# Patient Record
Sex: MALE | Race: WHITE | NOT HISPANIC OR LATINO | Employment: OTHER | URBAN - METROPOLITAN AREA
[De-identification: names, ages, dates, MRNs, and addresses within clinical notes are randomized per-mention and may not be internally consistent; named-entity substitution may affect disease eponyms.]

---

## 2018-06-13 DIAGNOSIS — N13.9 BENIGN LOCALIZED HYPERPLASIA OF PROSTATE WITH URINARY OBSTRUCTION AND LOWER URINARY TRACT SYMPTOMS: Primary | ICD-10-CM

## 2018-06-13 DIAGNOSIS — N40.1 BENIGN LOCALIZED HYPERPLASIA OF PROSTATE WITH URINARY OBSTRUCTION AND LOWER URINARY TRACT SYMPTOMS: Primary | ICD-10-CM

## 2018-06-13 RX ORDER — FINASTERIDE 5 MG/1
5 TABLET, FILM COATED ORAL DAILY
Qty: 90 TABLET | Refills: 0 | Status: SHIPPED | OUTPATIENT
Start: 2018-06-13 | End: 2018-09-17 | Stop reason: SDUPTHER

## 2018-06-13 NOTE — TELEPHONE ENCOUNTER
An Auto-fax Refill Request for Finasteride was received from [phone]  Patient's next office visit is scheduled for 7/17/18 and he will run out of medication until then    Script for same, 90 day supply with NO refills was queued and forwarded to Dr León Kwan for approval

## 2018-07-16 RX ORDER — TAMSULOSIN HYDROCHLORIDE 0.4 MG/1
1 CAPSULE ORAL
COMMUNITY
Start: 2017-09-11 | End: 2018-09-27 | Stop reason: SDUPTHER

## 2018-07-17 ENCOUNTER — OFFICE VISIT (OUTPATIENT)
Dept: UROLOGY | Facility: MEDICAL CENTER | Age: 78
End: 2018-07-17
Payer: MEDICARE

## 2018-07-17 VITALS
WEIGHT: 172 LBS | BODY MASS INDEX: 26.07 KG/M2 | SYSTOLIC BLOOD PRESSURE: 116 MMHG | HEIGHT: 68 IN | DIASTOLIC BLOOD PRESSURE: 62 MMHG

## 2018-07-17 DIAGNOSIS — N40.1 BENIGN PROSTATIC HYPERPLASIA WITH LOWER URINARY TRACT SYMPTOMS, SYMPTOM DETAILS UNSPECIFIED: Primary | ICD-10-CM

## 2018-07-17 DIAGNOSIS — R35.1 NOCTURIA: ICD-10-CM

## 2018-07-17 DIAGNOSIS — N52.2 DRUG-INDUCED ERECTILE DYSFUNCTION: ICD-10-CM

## 2018-07-17 DIAGNOSIS — R97.20 ELEVATED PSA: ICD-10-CM

## 2018-07-17 DIAGNOSIS — Z87.442 PERSONAL HISTORY OF URINARY CALCULI: ICD-10-CM

## 2018-07-17 PROCEDURE — 99214 OFFICE O/P EST MOD 30 MIN: CPT | Performed by: UROLOGY

## 2018-07-17 NOTE — PROGRESS NOTES
Assessment/Plan:      Diagnoses and all orders for this visit:    Benign prostatic hyperplasia with lower urinary tract symptoms, symptom details unspecified    Elevated PSA  -     PSA, total and free; Future    Nocturia    Personal history of urinary calculi    Drug-induced erectile dysfunction    Other orders  -     tamsulosin (FLOMAX) 0 4 mg; Take 1 capsule by mouth  -     Cholecalciferol 5000 units capsule; 1 tab daily        Plan:  Trial of sildenafil 3-4 tabs p r n     Check PSA, follow-up 1 year  Subjective:  No complaints, except some ED     Patient ID: Sharif Bui is a 77 y o  male  HPI  Patient is here in follow-up for his history of elevated PSA status post a negative prostate biopsy in the past   He has a history of BPH for which he underwent a TURP initially back in 1998 and has subsequently been on Flomax & Proscar to maintain his acceptable in usual voiding ability  He does have some associated nocturia 2-3 times per night, but he does not have any need to strain or bear down the started urinary stream, and feels he empties his bladder reasonably well  He has a history of nephrolithiasis for which he underwent an ESWL about 3 years ago  He is interested in some possible medications for his ED, which is likely related to his elderly age, history of cardiac disease and medications for such, his prior TURP, and he being medicated with 2 BPH meds  Review of Systems   Constitutional: Negative  HENT: Negative  Eyes: Negative  Respiratory: Negative  Cardiovascular: Negative  Gastrointestinal: Negative  Endocrine: Negative  Genitourinary: Positive for frequency and urgency  Musculoskeletal: Negative  Allergic/Immunologic: Negative  Neurological: Negative  Hematological: Negative  Psychiatric/Behavioral: Negative  Objective:     Physical Exam   Constitutional: He is oriented to person, place, and time  He appears well-developed and well-nourished   No distress  HENT:   Head: Normocephalic and atraumatic  Nose: Nose normal    Mouth/Throat: Oropharynx is clear and moist    Eyes: Conjunctivae and EOM are normal  Pupils are equal, round, and reactive to light  No scleral icterus  Neck: Normal range of motion  Neck supple  Cardiovascular: Normal rate, regular rhythm, normal heart sounds and intact distal pulses  No murmur heard  Pulmonary/Chest: Effort normal and breath sounds normal  No respiratory distress  He has no wheezes  He has no rales  Abdominal: Soft  Bowel sounds are normal  He exhibits no distension and no mass  There is no tenderness  Musculoskeletal: Normal range of motion  He exhibits no edema or tenderness  Lymphadenopathy:     He has no cervical adenopathy  Neurological: He is alert and oriented to person, place, and time  No cranial nerve deficit  Skin: Skin is warm and dry  No rash noted  No erythema  No pallor  Psychiatric: He has a normal mood and affect  His behavior is normal  Judgment and thought content normal    Nursing note and vitals reviewed

## 2018-07-19 ENCOUNTER — APPOINTMENT (OUTPATIENT)
Dept: LAB | Facility: CLINIC | Age: 78
End: 2018-07-19
Payer: MEDICARE

## 2018-07-19 DIAGNOSIS — R97.20 ELEVATED PSA: ICD-10-CM

## 2018-07-19 PROCEDURE — 84153 ASSAY OF PSA TOTAL: CPT

## 2018-07-19 PROCEDURE — 84154 ASSAY OF PSA FREE: CPT

## 2018-07-19 PROCEDURE — 36415 COLL VENOUS BLD VENIPUNCTURE: CPT

## 2018-07-21 LAB
PSA FREE MFR SERPL: 15.2 %
PSA FREE SERPL-MCNC: 0.35 NG/ML
PSA SERPL-MCNC: 2.3 NG/ML (ref 0–4)

## 2018-09-17 DIAGNOSIS — N13.9 BENIGN LOCALIZED HYPERPLASIA OF PROSTATE WITH URINARY OBSTRUCTION AND LOWER URINARY TRACT SYMPTOMS: ICD-10-CM

## 2018-09-17 DIAGNOSIS — N40.1 BENIGN LOCALIZED HYPERPLASIA OF PROSTATE WITH URINARY OBSTRUCTION AND LOWER URINARY TRACT SYMPTOMS: ICD-10-CM

## 2018-09-17 RX ORDER — FINASTERIDE 5 MG/1
TABLET, FILM COATED ORAL
Qty: 90 TABLET | Refills: 0 | Status: SHIPPED | OUTPATIENT
Start: 2018-09-17 | End: 2018-12-18 | Stop reason: SDUPTHER

## 2018-09-27 DIAGNOSIS — N13.9 BENIGN LOCALIZED HYPERPLASIA OF PROSTATE WITH URINARY OBSTRUCTION AND LOWER URINARY TRACT SYMPTOMS: Primary | ICD-10-CM

## 2018-09-27 DIAGNOSIS — N40.1 BENIGN LOCALIZED HYPERPLASIA OF PROSTATE WITH URINARY OBSTRUCTION AND LOWER URINARY TRACT SYMPTOMS: Primary | ICD-10-CM

## 2018-09-27 RX ORDER — TAMSULOSIN HYDROCHLORIDE 0.4 MG/1
0.4 CAPSULE ORAL
Qty: 90 CAPSULE | Refills: 3 | Status: SHIPPED | OUTPATIENT
Start: 2018-09-27 | End: 2019-10-15 | Stop reason: SDUPTHER

## 2018-09-27 NOTE — TELEPHONE ENCOUNTER
Patient called requesting refill on Tamsulosin 0 4mg    Request for same, 90 day supply with 3 refills was queued and forwarded to Dr Fer Ruth for approval

## 2018-12-18 DIAGNOSIS — N40.1 BENIGN LOCALIZED HYPERPLASIA OF PROSTATE WITH URINARY OBSTRUCTION AND LOWER URINARY TRACT SYMPTOMS: ICD-10-CM

## 2018-12-18 DIAGNOSIS — N13.9 BENIGN LOCALIZED HYPERPLASIA OF PROSTATE WITH URINARY OBSTRUCTION AND LOWER URINARY TRACT SYMPTOMS: ICD-10-CM

## 2018-12-18 RX ORDER — FINASTERIDE 5 MG/1
TABLET, FILM COATED ORAL
Qty: 90 TABLET | Refills: 2 | Status: SHIPPED | OUTPATIENT
Start: 2018-12-18 | End: 2019-09-10 | Stop reason: SDUPTHER

## 2018-12-18 NOTE — TELEPHONE ENCOUNTER
A [employer]Scripts Refill Request for Finasteride 5mg was received from [facility] DR SABIHA JORGENSEN  Patient was last seen in July, 2018; continuation of the medication was approved at that time    Script for same, 90 day supply with 2 refills was queued and forwarded to Dr Alice Whitt for approval

## 2019-03-25 ENCOUNTER — TRANSCRIBE ORDERS (OUTPATIENT)
Dept: ADMINISTRATIVE | Facility: HOSPITAL | Age: 79
End: 2019-03-25

## 2019-03-25 ENCOUNTER — APPOINTMENT (OUTPATIENT)
Dept: LAB | Facility: HOSPITAL | Age: 79
End: 2019-03-25
Attending: ORTHOPAEDIC SURGERY
Payer: MEDICARE

## 2019-03-25 DIAGNOSIS — M48.062 LUMBAR STENOSIS WITH NEUROGENIC CLAUDICATION: Primary | ICD-10-CM

## 2019-03-25 LAB
INR PPP: 1.34 (ref 0.86–1.16)
PROTHROMBIN TIME: 13.8 SECONDS (ref 9.4–11.7)

## 2019-03-25 PROCEDURE — 36415 COLL VENOUS BLD VENIPUNCTURE: CPT | Performed by: ORTHOPAEDIC SURGERY

## 2019-03-25 PROCEDURE — 85610 PROTHROMBIN TIME: CPT | Performed by: ORTHOPAEDIC SURGERY

## 2019-05-30 ENCOUNTER — OFFICE VISIT (OUTPATIENT)
Dept: UROLOGY | Facility: MEDICAL CENTER | Age: 79
End: 2019-05-30
Payer: MEDICARE

## 2019-05-30 VITALS
WEIGHT: 179 LBS | DIASTOLIC BLOOD PRESSURE: 70 MMHG | BODY MASS INDEX: 27.22 KG/M2 | SYSTOLIC BLOOD PRESSURE: 120 MMHG | HEART RATE: 84 BPM

## 2019-05-30 DIAGNOSIS — R35.1 NOCTURIA: ICD-10-CM

## 2019-05-30 DIAGNOSIS — R97.20 ELEVATED PSA: ICD-10-CM

## 2019-05-30 DIAGNOSIS — N52.2 DRUG-INDUCED ERECTILE DYSFUNCTION: ICD-10-CM

## 2019-05-30 DIAGNOSIS — N40.1 BENIGN PROSTATIC HYPERPLASIA WITH LOWER URINARY TRACT SYMPTOMS, SYMPTOM DETAILS UNSPECIFIED: Primary | ICD-10-CM

## 2019-05-30 DIAGNOSIS — Z87.442 PERSONAL HISTORY OF URINARY CALCULI: ICD-10-CM

## 2019-05-30 DIAGNOSIS — N28.89 RENAL MASS: ICD-10-CM

## 2019-05-30 LAB
SL AMB  POCT GLUCOSE, UA: ABNORMAL
SL AMB LEUKOCYTE ESTERASE,UA: ABNORMAL
SL AMB POCT BILIRUBIN,UA: ABNORMAL
SL AMB POCT BLOOD,UA: ABNORMAL
SL AMB POCT CLARITY,UA: CLEAR
SL AMB POCT COLOR,UA: YELLOW
SL AMB POCT KETONES,UA: ABNORMAL
SL AMB POCT NITRITE,UA: ABNORMAL
SL AMB POCT PH,UA: 5
SL AMB POCT SPECIFIC GRAVITY,UA: 1.02
SL AMB POCT URINE PROTEIN: ABNORMAL
SL AMB POCT UROBILINOGEN: 0.2

## 2019-05-30 PROCEDURE — 99214 OFFICE O/P EST MOD 30 MIN: CPT | Performed by: UROLOGY

## 2019-05-30 PROCEDURE — 81003 URINALYSIS AUTO W/O SCOPE: CPT | Performed by: UROLOGY

## 2019-06-04 ENCOUNTER — TRANSCRIBE ORDERS (OUTPATIENT)
Dept: ADMINISTRATIVE | Facility: HOSPITAL | Age: 79
End: 2019-06-04

## 2019-06-04 ENCOUNTER — APPOINTMENT (OUTPATIENT)
Dept: LAB | Facility: HOSPITAL | Age: 79
End: 2019-06-04
Payer: MEDICARE

## 2019-06-04 DIAGNOSIS — N28.89 RENAL MASS: ICD-10-CM

## 2019-06-04 LAB
ALBUMIN SERPL BCP-MCNC: 3.7 G/DL (ref 3.5–5)
ALP SERPL-CCNC: 88 U/L (ref 46–116)
ALT SERPL W P-5'-P-CCNC: 19 U/L (ref 12–78)
ANION GAP SERPL CALCULATED.3IONS-SCNC: 9 MMOL/L (ref 4–13)
AST SERPL W P-5'-P-CCNC: 18 U/L (ref 5–45)
BILIRUB SERPL-MCNC: 0.5 MG/DL (ref 0.2–1)
BUN SERPL-MCNC: 35 MG/DL (ref 5–25)
CALCIUM SERPL-MCNC: 8.1 MG/DL (ref 8.3–10.1)
CHLORIDE SERPL-SCNC: 109 MMOL/L (ref 100–108)
CO2 SERPL-SCNC: 28 MMOL/L (ref 21–32)
CREAT SERPL-MCNC: 0.96 MG/DL (ref 0.6–1.3)
GFR SERPL CREATININE-BSD FRML MDRD: 75 ML/MIN/1.73SQ M
GLUCOSE P FAST SERPL-MCNC: 100 MG/DL (ref 65–99)
POTASSIUM SERPL-SCNC: 4 MMOL/L (ref 3.5–5.3)
PROT SERPL-MCNC: 7 G/DL (ref 6.4–8.2)
SODIUM SERPL-SCNC: 146 MMOL/L (ref 136–145)

## 2019-06-04 PROCEDURE — 80053 COMPREHEN METABOLIC PANEL: CPT

## 2019-06-04 PROCEDURE — 36415 COLL VENOUS BLD VENIPUNCTURE: CPT

## 2019-06-06 ENCOUNTER — HOSPITAL ENCOUNTER (OUTPATIENT)
Dept: RADIOLOGY | Facility: HOSPITAL | Age: 79
Discharge: HOME | End: 2019-06-06
Payer: MEDICARE

## 2019-06-06 DIAGNOSIS — N28.89 RENAL MASS: ICD-10-CM

## 2019-06-06 PROCEDURE — 74178 CT ABD&PLV WO CNTR FLWD CNTR: CPT

## 2019-06-06 RX ADMIN — IOHEXOL 100 ML: 350 INJECTION, SOLUTION INTRAVENOUS at 08:18

## 2019-06-11 ENCOUNTER — TELEPHONE (OUTPATIENT)
Dept: UROLOGY | Facility: MEDICAL CENTER | Age: 79
End: 2019-06-11

## 2019-06-21 ENCOUNTER — OFFICE VISIT (OUTPATIENT)
Dept: UROLOGY | Facility: MEDICAL CENTER | Age: 79
End: 2019-06-21
Payer: MEDICARE

## 2019-06-21 VITALS
HEIGHT: 68 IN | SYSTOLIC BLOOD PRESSURE: 130 MMHG | DIASTOLIC BLOOD PRESSURE: 78 MMHG | BODY MASS INDEX: 26.37 KG/M2 | HEART RATE: 82 BPM | WEIGHT: 174 LBS

## 2019-06-21 DIAGNOSIS — N52.2 DRUG-INDUCED ERECTILE DYSFUNCTION: ICD-10-CM

## 2019-06-21 DIAGNOSIS — R35.1 NOCTURIA: ICD-10-CM

## 2019-06-21 DIAGNOSIS — N28.1 RENAL CYST, RIGHT: ICD-10-CM

## 2019-06-21 DIAGNOSIS — N40.1 BENIGN PROSTATIC HYPERPLASIA WITH LOWER URINARY TRACT SYMPTOMS, SYMPTOM DETAILS UNSPECIFIED: Primary | ICD-10-CM

## 2019-06-21 DIAGNOSIS — Z87.442 PERSONAL HISTORY OF URINARY CALCULI: ICD-10-CM

## 2019-06-21 DIAGNOSIS — R97.20 ELEVATED PSA: ICD-10-CM

## 2019-06-21 LAB
SL AMB  POCT GLUCOSE, UA: ABNORMAL
SL AMB LEUKOCYTE ESTERASE,UA: ABNORMAL
SL AMB POCT BILIRUBIN,UA: ABNORMAL
SL AMB POCT BLOOD,UA: ABNORMAL
SL AMB POCT CLARITY,UA: CLEAR
SL AMB POCT COLOR,UA: YELLOW
SL AMB POCT KETONES,UA: ABNORMAL
SL AMB POCT NITRITE,UA: ABNORMAL
SL AMB POCT PH,UA: 5
SL AMB POCT SPECIFIC GRAVITY,UA: >=1.03
SL AMB POCT URINE PROTEIN: ABNORMAL
SL AMB POCT UROBILINOGEN: 0.2

## 2019-06-21 PROCEDURE — 81003 URINALYSIS AUTO W/O SCOPE: CPT | Performed by: UROLOGY

## 2019-06-21 PROCEDURE — 99214 OFFICE O/P EST MOD 30 MIN: CPT | Performed by: UROLOGY

## 2019-09-10 DIAGNOSIS — N13.9 BENIGN LOCALIZED HYPERPLASIA OF PROSTATE WITH URINARY OBSTRUCTION AND LOWER URINARY TRACT SYMPTOMS: ICD-10-CM

## 2019-09-10 DIAGNOSIS — N40.1 BENIGN LOCALIZED HYPERPLASIA OF PROSTATE WITH URINARY OBSTRUCTION AND LOWER URINARY TRACT SYMPTOMS: ICD-10-CM

## 2019-09-10 RX ORDER — FINASTERIDE 5 MG/1
TABLET, FILM COATED ORAL
Qty: 90 TABLET | Refills: 2 | Status: SHIPPED | OUTPATIENT
Start: 2019-09-10 | End: 2020-06-18 | Stop reason: SDUPTHER

## 2019-10-14 DIAGNOSIS — N40.1 BENIGN LOCALIZED HYPERPLASIA OF PROSTATE WITH URINARY OBSTRUCTION AND LOWER URINARY TRACT SYMPTOMS: ICD-10-CM

## 2019-10-14 DIAGNOSIS — N13.9 BENIGN LOCALIZED HYPERPLASIA OF PROSTATE WITH URINARY OBSTRUCTION AND LOWER URINARY TRACT SYMPTOMS: ICD-10-CM

## 2019-10-14 NOTE — TELEPHONE ENCOUNTER
Patient left a message on the Medication Refill voice mail line requesting a new prescription for Tamsulosin 0 4mg, 90 day supply to [facility] in [place], [place]

## 2019-10-15 RX ORDER — TAMSULOSIN HYDROCHLORIDE 0.4 MG/1
0.4 CAPSULE ORAL
Qty: 90 CAPSULE | Refills: 0 | Status: SHIPPED | OUTPATIENT
Start: 2019-10-15 | End: 2020-01-21 | Stop reason: SDUPTHER

## 2019-10-15 NOTE — TELEPHONE ENCOUNTER
The patient has an upcoming office visit scheduled for 12/26/19 with Dr Napoleon Lopez in the [facility] location but will run out of medication until then    Request for same, 90 day supply with NO refills was queued and forwarded to the Advanced Practitioner covering the [facility] location for approval

## 2019-12-20 ENCOUNTER — APPOINTMENT (OUTPATIENT)
Dept: LAB | Facility: HOSPITAL | Age: 79
End: 2019-12-20
Payer: MEDICARE

## 2019-12-20 ENCOUNTER — TRANSCRIBE ORDERS (OUTPATIENT)
Dept: ADMINISTRATIVE | Facility: HOSPITAL | Age: 79
End: 2019-12-20

## 2019-12-20 DIAGNOSIS — R97.20 ELEVATED PSA: ICD-10-CM

## 2019-12-20 PROCEDURE — 84153 ASSAY OF PSA TOTAL: CPT

## 2019-12-20 PROCEDURE — 84154 ASSAY OF PSA FREE: CPT

## 2019-12-20 PROCEDURE — 36415 COLL VENOUS BLD VENIPUNCTURE: CPT

## 2019-12-21 LAB
PSA FREE MFR SERPL: 31.1 %
PSA FREE SERPL-MCNC: 0.28 NG/ML
PSA SERPL-MCNC: 0.9 NG/ML (ref 0–4)

## 2019-12-26 ENCOUNTER — OFFICE VISIT (OUTPATIENT)
Dept: UROLOGY | Facility: MEDICAL CENTER | Age: 79
End: 2019-12-26
Payer: MEDICARE

## 2019-12-26 VITALS
WEIGHT: 177 LBS | SYSTOLIC BLOOD PRESSURE: 140 MMHG | HEART RATE: 114 BPM | DIASTOLIC BLOOD PRESSURE: 74 MMHG | HEIGHT: 68 IN | BODY MASS INDEX: 26.83 KG/M2

## 2019-12-26 DIAGNOSIS — N40.1 BENIGN LOCALIZED HYPERPLASIA OF PROSTATE WITH URINARY OBSTRUCTION AND LOWER URINARY TRACT SYMPTOMS: Primary | ICD-10-CM

## 2019-12-26 DIAGNOSIS — Z87.442 PERSONAL HISTORY OF URINARY CALCULI: ICD-10-CM

## 2019-12-26 DIAGNOSIS — N52.2 DRUG-INDUCED ERECTILE DYSFUNCTION: ICD-10-CM

## 2019-12-26 DIAGNOSIS — Z87.898 HISTORY OF ELEVATED PSA: ICD-10-CM

## 2019-12-26 DIAGNOSIS — N13.9 BENIGN LOCALIZED HYPERPLASIA OF PROSTATE WITH URINARY OBSTRUCTION AND LOWER URINARY TRACT SYMPTOMS: Primary | ICD-10-CM

## 2019-12-26 DIAGNOSIS — R35.1 NOCTURIA: ICD-10-CM

## 2019-12-26 DIAGNOSIS — N28.1 RENAL CYST, RIGHT: ICD-10-CM

## 2019-12-26 LAB
SL AMB  POCT GLUCOSE, UA: ABNORMAL
SL AMB LEUKOCYTE ESTERASE,UA: ABNORMAL
SL AMB POCT BILIRUBIN,UA: ABNORMAL
SL AMB POCT BLOOD,UA: ABNORMAL
SL AMB POCT CLARITY,UA: CLEAR
SL AMB POCT COLOR,UA: YELLOW
SL AMB POCT KETONES,UA: ABNORMAL
SL AMB POCT NITRITE,UA: ABNORMAL
SL AMB POCT PH,UA: 5.5
SL AMB POCT SPECIFIC GRAVITY,UA: 1.03
SL AMB POCT URINE PROTEIN: ABNORMAL
SL AMB POCT UROBILINOGEN: 0.2

## 2019-12-26 PROCEDURE — 99214 OFFICE O/P EST MOD 30 MIN: CPT | Performed by: UROLOGY

## 2019-12-26 PROCEDURE — 81003 URINALYSIS AUTO W/O SCOPE: CPT | Performed by: UROLOGY

## 2019-12-26 RX ORDER — DILTIAZEM HYDROCHLORIDE 120 MG/1
120 TABLET, FILM COATED ORAL EVERY 12 HOURS SCHEDULED
Refills: 2 | COMMUNITY
Start: 2019-10-28 | End: 2022-03-03

## 2019-12-26 NOTE — PROGRESS NOTES
Assessment/Plan:      Diagnoses and all orders for this visit:    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 and lower urinary tract symptoms  -     POCT urine dip auto non-scope  -     PSA, total and free; Future    History of elevated PSA  -     POCT urine dip auto non-scope  -     PSA, total and free; Future    Nocturia  -     POCT urine dip auto non-scope    Personal history of urinary calculi  -     POCT urine dip auto non-scope    Drug-induced erectile dysfunction  -     POCT urine dip auto non-scope    Renal cyst, right  -     POCT urine dip auto non-scope    Other orders  -     diltiazem (CARDIZEM) 120 MG tablet; Take 120 mg by mouth every evening          Subjective:  No complaints     Patient ID: Raul Branch is a [de-identified] y o  male  HPI  Patient is here in follow-up for his history of elevated PSA status post a negative prostate biopsy in the past  [facility] has a history of BPH for which he underwent a TURP initially back in 1998 and has subsequently been on Flomax & Proscar to maintain his acceptable in usual voiding ability  [facility] does have some associated nocturia 2-3 times per night, but he does not have any need to strain or bear down the started urinary stream, and feels he empties his bladder reasonably well        He has a history of nephrolithiasis for which he underwent an ESWL about 3 years ago      He is interested in some possible medications for his ED, which is likely related to his elderly age, history of cardiac disease and medications for such, his prior TURP, and he being medicated with 2 BPH meds   We gave him a trial of sildenafil 3-4 tabs p r n , and follow-up for that      He underwent an MRI through an outside facility with suggested a left renal mass  He then underwent a CT renal protocol in June of 2019, which was only notable for a simple right renal cyst    He denies any flank pains, but does have back pains related to his spine and orthopedic issues      Review of Systems   Constitutional: Negative  HENT: Negative  Eyes: Negative  Respiratory: Negative  Cardiovascular: Negative  Gastrointestinal: Negative  Endocrine: Negative  Genitourinary: Negative  Musculoskeletal: Negative  Skin: Negative  Allergic/Immunologic: Negative  Neurological: Negative  Hematological: Negative  Psychiatric/Behavioral: Negative  Objective:     Physical Exam   Constitutional: He is oriented to person, place, and time  He appears well-developed and well-nourished  No distress  HENT:   Head: Normocephalic and atraumatic  Nose: Nose normal    Mouth/Throat: Oropharynx is clear and moist    Eyes: Pupils are equal, round, and reactive to light  Conjunctivae and EOM are normal  No scleral icterus  Neck: Normal range of motion  Neck supple  Cardiovascular: Normal rate, regular rhythm, normal heart sounds and intact distal pulses  No murmur heard  Pulmonary/Chest: Effort normal and breath sounds normal  No respiratory distress  He has no wheezes  He has no rales  Abdominal: Soft  Bowel sounds are normal  He exhibits no distension and no mass  There is no tenderness  Musculoskeletal: Normal range of motion  He exhibits no edema or tenderness  Lymphadenopathy:     He has no cervical adenopathy  Neurological: He is alert and oriented to person, place, and time  No cranial nerve deficit  Skin: Skin is warm and dry  No rash noted  No erythema  No pallor  Psychiatric: He has a normal mood and affect  His behavior is normal  Judgment and thought content normal    Nursing note and vitals reviewed  PSA, total and free   Order: [id number]   Status:  Final result   Visible to patient:  No (Inaccessible in [address]) Next appt:  None Dx:  Elevated PSA    Ref Range & Units 12/20/19 10:57 AM 7/19/18  9:04 AM   Prostate Specific Antigen Total 0 0 - 4 0 ng/mL 0 9  2 3 CM      PSA, Free N/A ng/mL 0 28  0 35 CM   Comment: Roche ECLIA methodology  PSA, Free Pct % 31 1

## 2020-01-21 DIAGNOSIS — N13.9 BENIGN LOCALIZED HYPERPLASIA OF PROSTATE WITH URINARY OBSTRUCTION AND LOWER URINARY TRACT SYMPTOMS: ICD-10-CM

## 2020-01-21 DIAGNOSIS — N40.1 BENIGN LOCALIZED HYPERPLASIA OF PROSTATE WITH URINARY OBSTRUCTION AND LOWER URINARY TRACT SYMPTOMS: ICD-10-CM

## 2020-01-21 NOTE — TELEPHONE ENCOUNTER
Patient left a message on the Medication Refill voice mail line requesting a new prescription for Tamsulosin 0 4mg, 90 day supply to [facility] in [place]

## 2020-01-22 NOTE — TELEPHONE ENCOUNTER
The patient has an upcoming office visit scheduled for 6/26/20 with Dr Emily Rose in the [facility] location but will run out of medication until then    Request for same, 90 day supply with 2 refills was queued and forwarded to the Advanced Practitioner covering the [facility] location for approval

## 2020-01-23 RX ORDER — TAMSULOSIN HYDROCHLORIDE 0.4 MG/1
0.4 CAPSULE ORAL
Qty: 90 CAPSULE | Refills: 2 | Status: SHIPPED | OUTPATIENT
Start: 2020-01-23 | End: 2020-04-23

## 2020-04-23 DIAGNOSIS — N40.1 BENIGN LOCALIZED HYPERPLASIA OF PROSTATE WITH URINARY OBSTRUCTION AND LOWER URINARY TRACT SYMPTOMS: ICD-10-CM

## 2020-04-23 DIAGNOSIS — N13.9 BENIGN LOCALIZED HYPERPLASIA OF PROSTATE WITH URINARY OBSTRUCTION AND LOWER URINARY TRACT SYMPTOMS: ICD-10-CM

## 2020-04-23 RX ORDER — TAMSULOSIN HYDROCHLORIDE 0.4 MG/1
CAPSULE ORAL
Qty: 90 CAPSULE | Refills: 0 | Status: SHIPPED | OUTPATIENT
Start: 2020-04-23 | End: 2020-07-23 | Stop reason: SDUPTHER

## 2020-06-18 DIAGNOSIS — N40.1 BENIGN LOCALIZED HYPERPLASIA OF PROSTATE WITH URINARY OBSTRUCTION AND LOWER URINARY TRACT SYMPTOMS: ICD-10-CM

## 2020-06-18 DIAGNOSIS — N13.9 BENIGN LOCALIZED HYPERPLASIA OF PROSTATE WITH URINARY OBSTRUCTION AND LOWER URINARY TRACT SYMPTOMS: ICD-10-CM

## 2020-06-18 RX ORDER — FINASTERIDE 5 MG/1
5 TABLET, FILM COATED ORAL DAILY
Qty: 90 TABLET | Refills: 0 | Status: SHIPPED | OUTPATIENT
Start: 2020-06-18 | End: 2020-06-28

## 2020-06-26 DIAGNOSIS — N13.9 BENIGN LOCALIZED HYPERPLASIA OF PROSTATE WITH URINARY OBSTRUCTION AND LOWER URINARY TRACT SYMPTOMS: ICD-10-CM

## 2020-06-26 DIAGNOSIS — N40.1 BENIGN LOCALIZED HYPERPLASIA OF PROSTATE WITH URINARY OBSTRUCTION AND LOWER URINARY TRACT SYMPTOMS: ICD-10-CM

## 2020-06-28 RX ORDER — FINASTERIDE 5 MG/1
TABLET, FILM COATED ORAL
Qty: 90 TABLET | Refills: 0 | Status: SHIPPED | OUTPATIENT
Start: 2020-06-28 | End: 2020-07-23 | Stop reason: SDUPTHER

## 2020-07-01 ENCOUNTER — TELEPHONE (OUTPATIENT)
Dept: UROLOGY | Facility: MEDICAL CENTER | Age: 80
End: 2020-07-01

## 2020-07-20 ENCOUNTER — APPOINTMENT (OUTPATIENT)
Dept: LAB | Facility: HOSPITAL | Age: 80
End: 2020-07-20
Payer: MEDICARE

## 2020-07-20 ENCOUNTER — TRANSCRIBE ORDERS (OUTPATIENT)
Dept: ADMINISTRATIVE | Facility: HOSPITAL | Age: 80
End: 2020-07-20

## 2020-07-20 DIAGNOSIS — N13.9 BENIGN LOCALIZED HYPERPLASIA OF PROSTATE WITH URINARY OBSTRUCTION AND LOWER URINARY TRACT SYMPTOMS: ICD-10-CM

## 2020-07-20 DIAGNOSIS — N40.1 BENIGN LOCALIZED HYPERPLASIA OF PROSTATE WITH URINARY OBSTRUCTION AND LOWER URINARY TRACT SYMPTOMS: ICD-10-CM

## 2020-07-20 DIAGNOSIS — Z87.898 HISTORY OF ELEVATED PSA: ICD-10-CM

## 2020-07-20 PROCEDURE — 36415 COLL VENOUS BLD VENIPUNCTURE: CPT

## 2020-07-20 PROCEDURE — 84153 ASSAY OF PSA TOTAL: CPT

## 2020-07-20 PROCEDURE — 84154 ASSAY OF PSA FREE: CPT

## 2020-07-21 RX ORDER — METFORMIN HYDROCHLORIDE 500 MG/1
500 TABLET, EXTENDED RELEASE ORAL
COMMUNITY
Start: 2020-04-27

## 2020-07-22 LAB
PSA FREE MFR SERPL: 23.3 %
PSA FREE SERPL-MCNC: 0.28 NG/ML
PSA SERPL-MCNC: 1.2 NG/ML (ref 0–4)

## 2020-07-23 ENCOUNTER — OFFICE VISIT (OUTPATIENT)
Dept: UROLOGY | Facility: MEDICAL CENTER | Age: 80
End: 2020-07-23
Payer: MEDICARE

## 2020-07-23 VITALS
DIASTOLIC BLOOD PRESSURE: 70 MMHG | HEIGHT: 68 IN | SYSTOLIC BLOOD PRESSURE: 138 MMHG | WEIGHT: 163 LBS | BODY MASS INDEX: 24.71 KG/M2

## 2020-07-23 DIAGNOSIS — N52.2 DRUG-INDUCED ERECTILE DYSFUNCTION: ICD-10-CM

## 2020-07-23 DIAGNOSIS — N28.1 RENAL CYST, RIGHT: ICD-10-CM

## 2020-07-23 DIAGNOSIS — N40.1 BENIGN LOCALIZED HYPERPLASIA OF PROSTATE WITH URINARY OBSTRUCTION AND LOWER URINARY TRACT SYMPTOMS: Primary | ICD-10-CM

## 2020-07-23 DIAGNOSIS — Z87.898 HISTORY OF ELEVATED PSA: ICD-10-CM

## 2020-07-23 DIAGNOSIS — N13.9 BENIGN LOCALIZED HYPERPLASIA OF PROSTATE WITH URINARY OBSTRUCTION AND LOWER URINARY TRACT SYMPTOMS: Primary | ICD-10-CM

## 2020-07-23 DIAGNOSIS — R35.1 NOCTURIA: ICD-10-CM

## 2020-07-23 DIAGNOSIS — Z87.442 PERSONAL HISTORY OF URINARY CALCULI: ICD-10-CM

## 2020-07-23 PROCEDURE — 88112 CYTOPATH CELL ENHANCE TECH: CPT | Performed by: PATHOLOGY

## 2020-07-23 PROCEDURE — 99214 OFFICE O/P EST MOD 30 MIN: CPT | Performed by: UROLOGY

## 2020-07-23 PROCEDURE — 81003 URINALYSIS AUTO W/O SCOPE: CPT | Performed by: UROLOGY

## 2020-07-23 RX ORDER — TAMSULOSIN HYDROCHLORIDE 0.4 MG/1
0.4 CAPSULE ORAL
Qty: 90 CAPSULE | Refills: 3 | Status: SHIPPED | OUTPATIENT
Start: 2020-07-23 | End: 2021-09-08

## 2020-07-23 RX ORDER — GABAPENTIN 100 MG/1
100 CAPSULE ORAL 3 TIMES DAILY
COMMUNITY
Start: 2020-07-16 | End: 2022-03-03

## 2020-07-23 RX ORDER — FINASTERIDE 5 MG/1
5 TABLET, FILM COATED ORAL DAILY
Qty: 90 TABLET | Refills: 3 | Status: SHIPPED | OUTPATIENT
Start: 2020-07-23 | End: 2021-10-12 | Stop reason: SDUPTHER

## 2020-07-23 NOTE — PROGRESS NOTES
Assessment/Plan:      Diagnoses and all orders for this visit:    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 and lower urinary tract symptoms  -     POCT urine dip auto non-scope  -     PSA Total, Diagnostic; Future  -     finasteride (PROSCAR) 5 mg tablet; Take 1 tablet (5 mg total) by mouth daily  -     tamsulosin (FLOMAX) 0 4 mg; Take 1 capsule (0 4 mg total) by mouth daily at bedtime    History of elevated PSA  -     POCT urine dip auto non-scope  -     PSA Total, Diagnostic; Future    Nocturia  -     POCT urine dip auto non-scope    Personal history of urinary calculi  -     POCT urine dip auto non-scope    Drug-induced erectile dysfunction  -     POCT urine dip auto non-scope    Renal cyst, right  -     POCT urine dip auto non-scope    Other orders  -     metFORMIN (GLUCOPHAGE-XR) 500 mg 24 hr tablet; Take 500 mg by mouth daily with breakfast  -     gabapentin (NEURONTIN) 100 mg capsule; Take 100 mg by mouth 3 (three) times a day          Subjective:  No complaints     Patient ID: Dwayne Caceres is a 79 y o  male      HPI  Patient is here in follow-up for his history of elevated PSA status post a negative prostate biopsy in the past  [facility] has a history of BPH for which he underwent a TURP initially back in 1998 and has subsequently been on Flomax & Proscar to maintain his acceptable in usual voiding ability  [facility] does have some associated nocturia 2-3 times per night, but he does not have any need to strain or bear down the started urinary stream, and feels he empties his bladder reasonably well        He has a history of nephrolithiasis for which he underwent an ESWL about 3 years ago      He is interested in some possible medications for his ED, which is likely related to his elderly age, history of cardiac disease and medications for such, his prior TURP, and he being medicated with 2 BPH meds   We gave him a trial of sildenafil 3-4 tabs p r n , and follow-up for that      He underwent an MRI through an outside facility with suggested a left renal mass   He then underwent a CT renal protocol in June of 2019, which was only notable for a simple right renal cyst    He denies any flank pains, but does have back pains related to his spine and orthopedic issues      Review of Systems   Constitutional: Positive for unexpected weight change  HENT: Negative  Eyes: Negative  Respiratory: Negative  Cardiovascular: Negative  Gastrointestinal: Negative  Endocrine: Negative  Genitourinary: Negative  Negative for difficulty urinating  Musculoskeletal: Positive for arthralgias, back pain and myalgias  Skin: Negative  Allergic/Immunologic: Negative  Neurological: Negative  Hematological: Negative  Psychiatric/Behavioral: Negative  Objective:     Physical Exam   Constitutional: He is oriented to person, place, and time  He appears well-developed and well-nourished  No distress  HENT:   Head: Normocephalic and atraumatic  Nose: Nose normal    Mouth/Throat: Oropharynx is clear and moist    Eyes: Pupils are equal, round, and reactive to light  Conjunctivae and EOM are normal  No scleral icterus  Neck: Normal range of motion  Neck supple  Pulmonary/Chest: Effort normal and breath sounds normal  No respiratory distress  He has no wheezes  He has no rales  Abdominal: Soft  Bowel sounds are normal  He exhibits no distension and no mass  There is no tenderness  Musculoskeletal: Normal range of motion  He exhibits no edema or tenderness  Lymphadenopathy:     He has no cervical adenopathy  Neurological: He is alert and oriented to person, place, and time  No cranial nerve deficit  Skin: Skin is warm and dry  No rash noted  No erythema  No pallor  Psychiatric: He has a normal mood and affect  His behavior is normal  Judgment and thought content normal    Nursing note and vitals reviewed          PSA, total and free    Ref Range & Units 7/20/20 11:12 AM 12/20/19 10:57 AM 7/19/18  9:04 AM   Prostate Specific Antigen Total 0 0 - 4 0 ng/mL 1 2  0 9 CM 2 3 CM      PSA, Free N/A ng/mL 0 28  0 28 CM 0 35 CM   Comment: Roche ECLIA methodology     PSA, Free Pct % 23 3  31 1 CM 15 2 CM

## 2020-08-25 ENCOUNTER — TELEPHONE (OUTPATIENT)
Dept: OBGYN CLINIC | Facility: HOSPITAL | Age: 80
End: 2020-08-25

## 2020-08-25 NOTE — TELEPHONE ENCOUNTER
Tried calling [facility] to inform  L/M stating the above outcome  If pt or [facility] calls back, please relay message

## 2020-08-25 NOTE — TELEPHONE ENCOUNTER
Evin Joshi is calling to cancel patient's appt tomorrow because she is not able to drop the films off at the [facility] before the appt time tomorrow  Evin Joshi did attempt to drop them off at the [facility] office today but they were not open but no one contacted her prior to let her know otherwise  Evin Joshi will not be able to get the 2nd opinion from Dr Cade Blakely at this time

## 2020-08-25 NOTE — TELEPHONE ENCOUNTER
For new patients, Dr Eva Angeles does prefer in person visit for a complete examination, however if this is not possible for the patient, yes it can be switched to a virtual visit      We are in [facility] office tomorrow, not [facility] office

## 2020-08-25 NOTE — TELEPHONE ENCOUNTER
Dr De Souza  called in asking if the appt on 8/26 11:00 am can be changed to a virtual visit       # [phone]

## 2021-03-19 ENCOUNTER — TRANSCRIBE ORDERS (OUTPATIENT)
Dept: ADMINISTRATIVE | Facility: HOSPITAL | Age: 81
End: 2021-03-19

## 2021-03-19 ENCOUNTER — HOSPITAL ENCOUNTER (OUTPATIENT)
Dept: RADIOLOGY | Facility: HOSPITAL | Age: 81
Discharge: HOME | End: 2021-03-19
Payer: MEDICARE

## 2021-03-19 DIAGNOSIS — M79.661 RIGHT CALF PAIN: Primary | ICD-10-CM

## 2021-03-19 DIAGNOSIS — M79.661 RIGHT CALF PAIN: ICD-10-CM

## 2021-03-19 PROCEDURE — 93971 EXTREMITY STUDY: CPT | Performed by: SURGERY

## 2021-03-19 PROCEDURE — 93971 EXTREMITY STUDY: CPT

## 2021-07-26 ENCOUNTER — OFFICE VISIT (OUTPATIENT)
Dept: UROLOGY | Facility: MEDICAL CENTER | Age: 81
End: 2021-07-26
Payer: MEDICARE

## 2021-07-26 VITALS
DIASTOLIC BLOOD PRESSURE: 64 MMHG | WEIGHT: 168 LBS | SYSTOLIC BLOOD PRESSURE: 130 MMHG | HEART RATE: 57 BPM | HEIGHT: 69 IN | BODY MASS INDEX: 24.88 KG/M2

## 2021-07-26 DIAGNOSIS — N40.1 BENIGN LOCALIZED HYPERPLASIA OF PROSTATE WITH URINARY OBSTRUCTION AND LOWER URINARY TRACT SYMPTOMS: ICD-10-CM

## 2021-07-26 DIAGNOSIS — R35.1 NOCTURIA: ICD-10-CM

## 2021-07-26 DIAGNOSIS — N40.1 BPH WITH OBSTRUCTION/LOWER URINARY TRACT SYMPTOMS: ICD-10-CM

## 2021-07-26 DIAGNOSIS — N39.43 DRIBBLING FOLLOWING URINATION: Primary | ICD-10-CM

## 2021-07-26 DIAGNOSIS — Z87.898 HISTORY OF ELEVATED PSA: ICD-10-CM

## 2021-07-26 DIAGNOSIS — N13.8 BPH WITH OBSTRUCTION/LOWER URINARY TRACT SYMPTOMS: ICD-10-CM

## 2021-07-26 DIAGNOSIS — N13.9 BENIGN LOCALIZED HYPERPLASIA OF PROSTATE WITH URINARY OBSTRUCTION AND LOWER URINARY TRACT SYMPTOMS: ICD-10-CM

## 2021-07-26 LAB
SL AMB  POCT GLUCOSE, UA: NEGATIVE
SL AMB LEUKOCYTE ESTERASE,UA: NEGATIVE
SL AMB POCT BILIRUBIN,UA: NEGATIVE
SL AMB POCT BLOOD,UA: ABNORMAL
SL AMB POCT CLARITY,UA: CLEAR
SL AMB POCT COLOR,UA: YELLOW
SL AMB POCT KETONES,UA: NEGATIVE
SL AMB POCT NITRITE,UA: NEGATIVE
SL AMB POCT PH,UA: 5.5
SL AMB POCT SPECIFIC GRAVITY,UA: >=1.03
SL AMB POCT URINE PROTEIN: ABNORMAL
SL AMB POCT UROBILINOGEN: 0.2

## 2021-07-26 PROCEDURE — 99214 OFFICE O/P EST MOD 30 MIN: CPT | Performed by: UROLOGY

## 2021-07-26 PROCEDURE — 81003 URINALYSIS AUTO W/O SCOPE: CPT | Performed by: UROLOGY

## 2021-07-26 NOTE — ASSESSMENT & PLAN NOTE
Offered cysto  Pt will think about it  I do not think there is much we will be able to do to help the patient in this regard

## 2021-07-26 NOTE — PROGRESS NOTES
Assessment/Plan:    Dribbling following urination  Offered cysto  Pt will think about it  I do not think there is much we will be able to do to help the patient in this regard  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 and lower urinary tract symptoms  Longstanding sx as noted  RTC in 6 months at pt request         Diagnoses and all orders for this visit:    Alexandra Estimable following urination    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 and lower urinary tract symptoms  -     POCT urine dip auto non-scope    History of elevated PSA  -     POCT urine dip auto non-scope    Nocturia  -     POCT urine dip auto non-scope    BPH with obstruction/lower urinary tract symptoms          Subjective:      Patient ID: Aliza Means is a 79 y o  male  HPI  BPH:  TURP a long time ago in [place] [id number]  Pt cannot recall when  He notes urinary frequency, weak stream and nocturia x 2  He denies other significant urinary symptoms  He denies gross hematuria, urinary tract infections or incontinence  He is taking tamsulosin (Flomax) and finasteride (Proscar) for his symptoms  Double voids for a few drops or the drop or two will come out in underwear  Terminal dribbling vexes pt  QOL impacted by arthritis and age related aches and pains and not just his urinary sx  PSA:  [  0   Lab Value Date/Time    PSA 1 2 07/20/2020 1112    PSA 0 9 12/20/2019 1057    PSA 2 3 07/19/2018 0904   ]      AUA SYMPTOM SCORE      Most Recent Value   AUA SYMPTOM SCORE   How often have you had a sensation of not emptying your bladder completely after you finished urinating? 2   How often have you had to urinate again less than two hours after you finished urinating? 2   How often have you found you stopped and started again several times when you urinate?  0   How often have you found it difficult to postpone urination?   0   How often have you had a weak urinary stream?  2   How often have you had to push or strain to begin urination? 0   How many times did you most typically get up to urinate from the time you went to bed at night until the time you got up in the morning? 2   Quality of Life: If you were to spend the rest of your life with your urinary condition just the way it is now, how would you feel about that?  4   AUA SYMPTOM SCORE  8            The following portions of the patient's history were reviewed and updated as appropriate: allergies, current medications, past family history, past medical history, past social history, past surgical history and problem list     Review of Systems   Constitutional: Negative for activity change and fatigue  Respiratory: Negative for shortness of breath and wheezing  Cardiovascular: Negative for chest pain  Hypertension  Coronary artery disease on warfarin  Gastrointestinal: Negative for abdominal pain  Endocrine:        Non- insulin dependent diabetes  Genitourinary: Negative for difficulty urinating, dysuria, frequency, hematuria and urgency  Remote hx of ESWL  Musculoskeletal: Negative for back pain and gait problem  Skin: Negative  Allergic/Immunologic: Negative  Neurological: Negative  Psychiatric/Behavioral: Negative  Objective:      /64   Pulse 57   Ht 5' 8 5" (1 74 m)   Wt 76 2 kg (168 lb)   BMI 25 17 kg/m²          Physical Exam  Constitutional:       Appearance: He is well-developed  HENT:      Head: Normocephalic and atraumatic  Pulmonary:      Effort: Pulmonary effort is normal    Genitourinary:     Rectum: Normal       Comments: The prostate is 30 gm, firm, smooth, non-tender  Musculoskeletal:         General: Normal range of motion  Cervical back: Normal range of motion and neck supple  Skin:     General: Skin is warm and dry  Neurological:      Mental Status: He is alert and oriented to person, place, and time     Psychiatric:         Behavior: Behavior normal  Thought Content:  Thought content normal          Judgment: Judgment normal

## 2021-09-08 DIAGNOSIS — N13.9 BENIGN LOCALIZED HYPERPLASIA OF PROSTATE WITH URINARY OBSTRUCTION AND LOWER URINARY TRACT SYMPTOMS: ICD-10-CM

## 2021-09-08 DIAGNOSIS — N40.1 BENIGN LOCALIZED HYPERPLASIA OF PROSTATE WITH URINARY OBSTRUCTION AND LOWER URINARY TRACT SYMPTOMS: ICD-10-CM

## 2021-09-08 RX ORDER — TAMSULOSIN HYDROCHLORIDE 0.4 MG/1
CAPSULE ORAL
Qty: 90 CAPSULE | Refills: 0 | Status: SHIPPED | OUTPATIENT
Start: 2021-09-08 | End: 2021-09-09 | Stop reason: SDUPTHER

## 2021-09-09 DIAGNOSIS — N40.1 BENIGN LOCALIZED HYPERPLASIA OF PROSTATE WITH URINARY OBSTRUCTION AND LOWER URINARY TRACT SYMPTOMS: ICD-10-CM

## 2021-09-09 DIAGNOSIS — N13.9 BENIGN LOCALIZED HYPERPLASIA OF PROSTATE WITH URINARY OBSTRUCTION AND LOWER URINARY TRACT SYMPTOMS: ICD-10-CM

## 2021-09-09 RX ORDER — TAMSULOSIN HYDROCHLORIDE 0.4 MG/1
0.4 CAPSULE ORAL
Qty: 90 CAPSULE | Refills: 3 | Status: SHIPPED | OUTPATIENT
Start: 2021-09-09

## 2021-10-12 DIAGNOSIS — N13.9 BENIGN LOCALIZED HYPERPLASIA OF PROSTATE WITH URINARY OBSTRUCTION AND LOWER URINARY TRACT SYMPTOMS: ICD-10-CM

## 2021-10-12 DIAGNOSIS — N40.1 BENIGN LOCALIZED HYPERPLASIA OF PROSTATE WITH URINARY OBSTRUCTION AND LOWER URINARY TRACT SYMPTOMS: ICD-10-CM

## 2021-10-12 RX ORDER — FINASTERIDE 5 MG/1
5 TABLET, FILM COATED ORAL DAILY
Qty: 90 TABLET | Refills: 3 | Status: SHIPPED | OUTPATIENT
Start: 2021-10-12

## 2022-01-21 ENCOUNTER — TELEPHONE (OUTPATIENT)
Dept: UROLOGY | Facility: MEDICAL CENTER | Age: 82
End: 2022-01-21

## 2022-01-21 NOTE — TELEPHONE ENCOUNTER
Called pt to reschedule his appt with Dr Christine Cobian from 1/28/22  Patient's daughter-in-law stated that the patient would like to see a doctor in [place], but she could not remember the name  I mailed to him a medical record release to fill out when he knows who he wants to see so they can get records

## 2022-03-03 ENCOUNTER — APPOINTMENT (EMERGENCY)
Dept: NON INVASIVE DIAGNOSTICS | Facility: HOSPITAL | Age: 82
DRG: 683 | End: 2022-03-03
Payer: MEDICARE

## 2022-03-03 ENCOUNTER — HOSPITAL ENCOUNTER (INPATIENT)
Facility: HOSPITAL | Age: 82
LOS: 3 days | Discharge: HOME | DRG: 683 | End: 2022-03-06
Attending: EMERGENCY MEDICINE | Admitting: ANESTHESIOLOGY
Payer: MEDICARE

## 2022-03-03 DIAGNOSIS — R19.7 DIARRHEA: ICD-10-CM

## 2022-03-03 DIAGNOSIS — I48.91 ATRIAL FIBRILLATION (HCC): ICD-10-CM

## 2022-03-03 DIAGNOSIS — R60.0 BILATERAL LOWER EXTREMITY EDEMA: ICD-10-CM

## 2022-03-03 DIAGNOSIS — R00.1 BRADYCARDIA: ICD-10-CM

## 2022-03-03 DIAGNOSIS — E87.5 HYPERKALEMIA: ICD-10-CM

## 2022-03-03 DIAGNOSIS — D72.829 LEUKOCYTOSIS: ICD-10-CM

## 2022-03-03 DIAGNOSIS — C91.10 CLL (CHRONIC LYMPHOCYTIC LEUKEMIA) (HCC): ICD-10-CM

## 2022-03-03 DIAGNOSIS — N17.9 AKI (ACUTE KIDNEY INJURY) (HCC): ICD-10-CM

## 2022-03-03 DIAGNOSIS — E86.0 DEHYDRATION: ICD-10-CM

## 2022-03-03 DIAGNOSIS — I48.11 LONGSTANDING PERSISTENT ATRIAL FIBRILLATION (HCC): ICD-10-CM

## 2022-03-03 DIAGNOSIS — I95.9 HYPOTENSION: Primary | ICD-10-CM

## 2022-03-03 PROBLEM — E11.9 TYPE 2 DIABETES MELLITUS (HCC): Status: ACTIVE | Noted: 2022-03-03

## 2022-03-03 PROBLEM — E78.5 HYPERLIPIDEMIA: Status: ACTIVE | Noted: 2022-03-03

## 2022-03-03 PROBLEM — I07.1 SEVERE TRICUSPID VALVE REGURGITATION: Status: ACTIVE | Noted: 2022-03-03

## 2022-03-03 PROBLEM — H91.90 HARD OF HEARING: Status: ACTIVE | Noted: 2022-03-03

## 2022-03-03 PROBLEM — Z79.01 ANTICOAGULATED ON COUMADIN: Status: ACTIVE | Noted: 2022-03-03

## 2022-03-03 LAB
2HR DELTA HS TROPONIN: -1 NG/L
4HR DELTA HS TROPONIN: -1 NG/L
ALBUMIN SERPL BCP-MCNC: 3.2 G/DL (ref 3.5–5)
ALP SERPL-CCNC: 107 U/L (ref 46–116)
ALT SERPL W P-5'-P-CCNC: 22 U/L (ref 12–78)
ANION GAP SERPL CALCULATED.3IONS-SCNC: 10 MMOL/L (ref 4–13)
ANION GAP SERPL CALCULATED.3IONS-SCNC: 12 MMOL/L (ref 4–13)
AORTIC ROOT: 3.7 CM
APTT PPP: 38 SECONDS (ref 23–37)
AST SERPL W P-5'-P-CCNC: 48 U/L (ref 5–45)
BASOPHILS # BLD MANUAL: 0 THOUSAND/UL (ref 0–0.1)
BASOPHILS NFR MAR MANUAL: 0 % (ref 0–1)
BILIRUB SERPL-MCNC: 1.22 MG/DL (ref 0.2–1)
BUN SERPL-MCNC: 36 MG/DL (ref 5–25)
BUN SERPL-MCNC: 37 MG/DL (ref 5–25)
CA-I BLD-SCNC: 1.05 MMOL/L (ref 1.12–1.32)
CALCIUM ALBUM COR SERPL-MCNC: 8.8 MG/DL (ref 8.3–10.1)
CALCIUM SERPL-MCNC: 8 MG/DL (ref 8.3–10.1)
CALCIUM SERPL-MCNC: 8.2 MG/DL (ref 8.3–10.1)
CARDIAC TROPONIN I PNL SERPL HS: 10 NG/L
CARDIAC TROPONIN I PNL SERPL HS: 10 NG/L
CARDIAC TROPONIN I PNL SERPL HS: 11 NG/L
CHLORIDE SERPL-SCNC: 107 MMOL/L (ref 100–108)
CHLORIDE SERPL-SCNC: 109 MMOL/L (ref 100–108)
CO2 SERPL-SCNC: 22 MMOL/L (ref 21–32)
CO2 SERPL-SCNC: 23 MMOL/L (ref 21–32)
CREAT SERPL-MCNC: 1.49 MG/DL (ref 0.6–1.3)
CREAT SERPL-MCNC: 1.5 MG/DL (ref 0.6–1.3)
DIGOXIN SERPL-MCNC: 1.1 NG/ML (ref 0.8–2)
E WAVE DECELERATION TIME: 189 MS
EOSINOPHIL # BLD MANUAL: 0 THOUSAND/UL (ref 0–0.4)
EOSINOPHIL NFR BLD MANUAL: 0 % (ref 0–6)
ERYTHROCYTE [DISTWIDTH] IN BLOOD BY AUTOMATED COUNT: 17.3 % (ref 11.6–15.1)
FLUAV RNA RESP QL NAA+PROBE: NEGATIVE
FLUBV RNA RESP QL NAA+PROBE: NEGATIVE
FRACTIONAL SHORTENING: 27 % (ref 28–44)
GFR SERPL CREATININE-BSD FRML MDRD: 42 ML/MIN/1.73SQ M
GFR SERPL CREATININE-BSD FRML MDRD: 43 ML/MIN/1.73SQ M
GLUCOSE SERPL-MCNC: 134 MG/DL (ref 65–140)
GLUCOSE SERPL-MCNC: 143 MG/DL (ref 65–140)
GLUCOSE SERPL-MCNC: 85 MG/DL (ref 65–140)
GLUCOSE SERPL-MCNC: 99 MG/DL (ref 65–140)
HCT VFR BLD AUTO: 33.1 % (ref 36.5–49.3)
HGB BLD-MCNC: 9.8 G/DL (ref 12–17)
INR PPP: 3.4 (ref 0.84–1.19)
INTERVENTRICULAR SEPTUM IN DIASTOLE (PARASTERNAL SHORT AXIS VIEW): 0.9 CM
INTERVENTRICULAR SEPTUM: 0.9 CM (ref 0.52–0.98)
LAAS-AP2: 39.8 CM2
LAAS-AP4: 28.6 CM2
LEFT ATRIUM SIZE: 5.3 CM
LEFT INTERNAL DIMENSION IN SYSTOLE: 3.3 CM (ref 2.67–4.04)
LEFT VENTRICULAR INTERNAL DIMENSION IN DIASTOLE: 4.5 CM (ref 4.37–6.51)
LEFT VENTRICULAR POSTERIOR WALL IN END DIASTOLE: 1 CM (ref 0.51–0.96)
LEFT VENTRICULAR STROKE VOLUME: 48 ML
LVSV (TEICH): 48 ML
LYMPHOCYTES # BLD AUTO: 79.36 THOUSAND/UL (ref 0.6–4.47)
LYMPHOCYTES # BLD AUTO: 89 % (ref 14–44)
MAGNESIUM SERPL-MCNC: 2.3 MG/DL (ref 1.6–2.6)
MCH RBC QN AUTO: 30 PG (ref 26.8–34.3)
MCHC RBC AUTO-ENTMCNC: 29.6 G/DL (ref 31.4–37.4)
MCV RBC AUTO: 101 FL (ref 82–98)
METAMYELOCYTES NFR BLD MANUAL: 1 % (ref 0–1)
MONOCYTES # BLD AUTO: 2.68 THOUSAND/UL (ref 0–1.22)
MONOCYTES NFR BLD: 3 % (ref 4–12)
MV E'TISSUE VEL-LAT: 12 CM/S
MV E'TISSUE VEL-SEP: 7 CM/S
MV PEAK A VEL: 0.01 M/S
MV PEAK E VEL: 130 CM/S
MV STENOSIS PRESSURE HALF TIME: 55 MS
MV VALVE AREA P 1/2 METHOD: 4 CM2
NEUTROPHILS # BLD MANUAL: 6.24 THOUSAND/UL (ref 1.85–7.62)
NEUTS SEG NFR BLD AUTO: 7 % (ref 43–75)
NT-PROBNP SERPL-MCNC: 4527 PG/ML
PHOSPHATE SERPL-MCNC: 5.3 MG/DL (ref 2.3–4.1)
PLATELET # BLD AUTO: 74 THOUSANDS/UL (ref 149–390)
PLATELET BLD QL SMEAR: ABNORMAL
PMV BLD AUTO: 10 FL (ref 8.9–12.7)
POTASSIUM SERPL-SCNC: 5.2 MMOL/L (ref 3.5–5.3)
POTASSIUM SERPL-SCNC: 6 MMOL/L (ref 3.5–5.3)
PROT SERPL-MCNC: 6 G/DL (ref 6.4–8.2)
PROTHROMBIN TIME: 33.1 SECONDS (ref 11.6–14.5)
RBC # BLD AUTO: 3.27 MILLION/UL (ref 3.88–5.62)
RBC MORPH BLD: NORMAL
RIGHT VENTRICLE ID DIMENSION: 5.5 CM
RSV RNA RESP QL NAA+PROBE: NEGATIVE
SARS-COV-2 RNA RESP QL NAA+PROBE: NEGATIVE
SL CV LEFT ATRIUM LENGTH A2C: 8.5 CM
SL CV LV EF: 55
SL CV PED ECHO LEFT VENTRICLE DIASTOLIC VOLUME (MOD BIPLANE) 2D: 91 ML
SL CV PED ECHO LEFT VENTRICLE SYSTOLIC VOLUME (MOD BIPLANE) 2D: 43 ML
SMUDGE CELLS BLD QL SMEAR: PRESENT
SODIUM SERPL-SCNC: 141 MMOL/L (ref 136–145)
SODIUM SERPL-SCNC: 142 MMOL/L (ref 136–145)
TR MAX PG: 23 MMHG
TR PEAK VELOCITY: 2.4 M/S
TRICUSPID VALVE PEAK REGURGITATION VELOCITY: 2.4 M/S
WBC # BLD AUTO: 89.17 THOUSAND/UL (ref 4.31–10.16)
Z-SCORE OF INTERVENTRICULAR SEPTUM IN END DIASTOLE: 1.29
Z-SCORE OF LEFT VENTRICULAR DIMENSION IN END DIASTOLE: -1.7
Z-SCORE OF LEFT VENTRICULAR DIMENSION IN END SYSTOLE: 0.04
Z-SCORE OF LEFT VENTRICULAR POSTERIOR WALL IN END DIASTOLE: 2.26

## 2022-03-03 PROCEDURE — 81001 URINALYSIS AUTO W/SCOPE: CPT | Performed by: EMERGENCY MEDICINE

## 2022-03-03 PROCEDURE — 82948 REAGENT STRIP/BLOOD GLUCOSE: CPT

## 2022-03-03 PROCEDURE — 99222 1ST HOSP IP/OBS MODERATE 55: CPT | Performed by: INTERNAL MEDICINE

## 2022-03-03 PROCEDURE — 0241U HB NFCT DS VIR RESP RNA 4 TRGT: CPT | Performed by: PHYSICIAN ASSISTANT

## 2022-03-03 PROCEDURE — 83880 ASSAY OF NATRIURETIC PEPTIDE: CPT | Performed by: EMERGENCY MEDICINE

## 2022-03-03 PROCEDURE — 82330 ASSAY OF CALCIUM: CPT | Performed by: PHYSICIAN ASSISTANT

## 2022-03-03 PROCEDURE — 85007 BL SMEAR W/DIFF WBC COUNT: CPT

## 2022-03-03 PROCEDURE — 85610 PROTHROMBIN TIME: CPT | Performed by: EMERGENCY MEDICINE

## 2022-03-03 PROCEDURE — 99291 CRITICAL CARE FIRST HOUR: CPT | Performed by: PHYSICIAN ASSISTANT

## 2022-03-03 PROCEDURE — 80048 BASIC METABOLIC PNL TOTAL CA: CPT | Performed by: PHYSICIAN ASSISTANT

## 2022-03-03 PROCEDURE — 84484 ASSAY OF TROPONIN QUANT: CPT

## 2022-03-03 PROCEDURE — 96365 THER/PROPH/DIAG IV INF INIT: CPT

## 2022-03-03 PROCEDURE — 93306 TTE W/DOPPLER COMPLETE: CPT | Performed by: INTERNAL MEDICINE

## 2022-03-03 PROCEDURE — 85730 THROMBOPLASTIN TIME PARTIAL: CPT | Performed by: EMERGENCY MEDICINE

## 2022-03-03 PROCEDURE — 87081 CULTURE SCREEN ONLY: CPT | Performed by: PHYSICIAN ASSISTANT

## 2022-03-03 PROCEDURE — 93005 ELECTROCARDIOGRAM TRACING: CPT

## 2022-03-03 PROCEDURE — 80162 ASSAY OF DIGOXIN TOTAL: CPT | Performed by: EMERGENCY MEDICINE

## 2022-03-03 PROCEDURE — 1124F ACP DISCUSS-NO DSCNMKR DOCD: CPT | Performed by: EMERGENCY MEDICINE

## 2022-03-03 PROCEDURE — 80053 COMPREHEN METABOLIC PANEL: CPT

## 2022-03-03 PROCEDURE — 85027 COMPLETE CBC AUTOMATED: CPT

## 2022-03-03 PROCEDURE — 99291 CRITICAL CARE FIRST HOUR: CPT | Performed by: EMERGENCY MEDICINE

## 2022-03-03 PROCEDURE — 83735 ASSAY OF MAGNESIUM: CPT | Performed by: EMERGENCY MEDICINE

## 2022-03-03 PROCEDURE — 96375 TX/PRO/DX INJ NEW DRUG ADDON: CPT

## 2022-03-03 PROCEDURE — 36415 COLL VENOUS BLD VENIPUNCTURE: CPT

## 2022-03-03 PROCEDURE — 87040 BLOOD CULTURE FOR BACTERIA: CPT | Performed by: PHYSICIAN ASSISTANT

## 2022-03-03 PROCEDURE — 99285 EMERGENCY DEPT VISIT HI MDM: CPT

## 2022-03-03 PROCEDURE — 84100 ASSAY OF PHOSPHORUS: CPT | Performed by: ANESTHESIOLOGY

## 2022-03-03 PROCEDURE — 93306 TTE W/DOPPLER COMPLETE: CPT

## 2022-03-03 RX ORDER — CALCIUM GLUCONATE 20 MG/ML
1 INJECTION, SOLUTION INTRAVENOUS ONCE
Status: COMPLETED | OUTPATIENT
Start: 2022-03-03 | End: 2022-03-03

## 2022-03-03 RX ORDER — FINASTERIDE 5 MG/1
5 TABLET, FILM COATED ORAL DAILY
Status: DISCONTINUED | OUTPATIENT
Start: 2022-03-04 | End: 2022-03-06 | Stop reason: HOSPADM

## 2022-03-03 RX ORDER — DIGOXIN 125 MCG
125 TABLET ORAL DAILY
COMMUNITY
End: 2022-03-06 | Stop reason: HOSPADM

## 2022-03-03 RX ORDER — TAMSULOSIN HYDROCHLORIDE 0.4 MG/1
0.4 CAPSULE ORAL
Status: DISCONTINUED | OUTPATIENT
Start: 2022-03-03 | End: 2022-03-06 | Stop reason: HOSPADM

## 2022-03-03 RX ORDER — IRON POLYSACCHARIDE COMPLEX 150 MG
150 CAPSULE ORAL 2 TIMES DAILY
COMMUNITY

## 2022-03-03 RX ORDER — DEXTROSE 10 % IN WATER 10 %
INTRAVENOUS SOLUTION INTRAVENOUS
Status: COMPLETED
Start: 2022-03-03 | End: 2022-03-03

## 2022-03-03 RX ORDER — ATORVASTATIN CALCIUM 40 MG/1
40 TABLET, FILM COATED ORAL DAILY
Status: DISCONTINUED | OUTPATIENT
Start: 2022-03-04 | End: 2022-03-06 | Stop reason: HOSPADM

## 2022-03-03 RX ORDER — SODIUM CHLORIDE, SODIUM GLUCONATE, SODIUM ACETATE, POTASSIUM CHLORIDE, MAGNESIUM CHLORIDE, SODIUM PHOSPHATE, DIBASIC, AND POTASSIUM PHOSPHATE .53; .5; .37; .037; .03; .012; .00082 G/100ML; G/100ML; G/100ML; G/100ML; G/100ML; G/100ML; G/100ML
1000 INJECTION, SOLUTION INTRAVENOUS ONCE
Status: COMPLETED | OUTPATIENT
Start: 2022-03-03 | End: 2022-03-03

## 2022-03-03 RX ORDER — FUROSEMIDE 20 MG/1
20 TABLET ORAL DAILY
Status: ON HOLD | COMMUNITY
End: 2022-03-06 | Stop reason: SDUPTHER

## 2022-03-03 RX ORDER — CALCIUM GLUCONATE 20 MG/ML
2 INJECTION, SOLUTION INTRAVENOUS ONCE
Status: COMPLETED | OUTPATIENT
Start: 2022-03-03 | End: 2022-03-03

## 2022-03-03 RX ORDER — DEXTROSE 10 % IN WATER 10 %
250 INTRAVENOUS SOLUTION INTRAVENOUS ONCE
Status: COMPLETED | OUTPATIENT
Start: 2022-03-03 | End: 2022-03-03

## 2022-03-03 RX ORDER — CARVEDILOL 12.5 MG/1
12.5 TABLET ORAL 2 TIMES DAILY WITH MEALS
COMMUNITY
End: 2022-03-06 | Stop reason: HOSPADM

## 2022-03-03 RX ADMIN — SODIUM CHLORIDE 500 ML: 0.9 INJECTION, SOLUTION INTRAVENOUS at 15:56

## 2022-03-03 RX ADMIN — DEXTROSE MONOHYDRATE 250 ML: 100 INJECTION, SOLUTION INTRAVENOUS at 15:18

## 2022-03-03 RX ADMIN — SODIUM CHLORIDE, SODIUM GLUCONATE, SODIUM ACETATE, POTASSIUM CHLORIDE, MAGNESIUM CHLORIDE, SODIUM PHOSPHATE, DIBASIC, AND POTASSIUM PHOSPHATE 1000 ML: .53; .5; .37; .037; .03; .012; .00082 INJECTION, SOLUTION INTRAVENOUS at 17:58

## 2022-03-03 RX ADMIN — SODIUM CHLORIDE 500 ML: 0.9 INJECTION, SOLUTION INTRAVENOUS at 14:54

## 2022-03-03 RX ADMIN — CALCIUM GLUCONATE 2 G: 20 INJECTION, SOLUTION INTRAVENOUS at 21:21

## 2022-03-03 RX ADMIN — TAMSULOSIN HYDROCHLORIDE 0.4 MG: 0.4 CAPSULE ORAL at 22:26

## 2022-03-03 RX ADMIN — INSULIN HUMAN 10 UNITS: 100 INJECTION, SOLUTION PARENTERAL at 15:21

## 2022-03-03 RX ADMIN — Medication 125 MG: at 23:53

## 2022-03-03 RX ADMIN — Medication 125 MG: at 18:12

## 2022-03-03 RX ADMIN — CALCIUM GLUCONATE 1 G: 20 INJECTION, SOLUTION INTRAVENOUS at 15:59

## 2022-03-03 RX ADMIN — Medication 250 ML: at 15:18

## 2022-03-03 NOTE — ASSESSMENT & PLAN NOTE
Lab Results   Component Value Date    HGBA1C 5 8 (H) 12/09/2021       Recent Labs     03/03/22  1610   POCGLU 143*       Blood Sugar Average: Last 72 hrs:  (P) 143     · Hold home metformin   · Start ISS algorithm 1 and adjust for BG goal 140-180

## 2022-03-03 NOTE — ASSESSMENT & PLAN NOTE
· K 6 0 with PLACIDO, dehydration   · In setting of BRASH syndrome with AV gavin blockers as well   · Monitor ECG  · Received calcium gluconate, 10 U IV insulin and D10 in ER as well as IVF   · Continue maintenance fluids   · Recheck BMP and continue to medically treat as indicated

## 2022-03-03 NOTE — ASSESSMENT & PLAN NOTE
· Secondary to bradycardia HR 30-40s and dehydration with 3 weeks loose stools and poor PO intake   · BP responsive to fluids in ER   · Will check blood cultures and was started on PO vanco with significantly elevated WBC and loose stools   · Likely large component secondary to bradycardia - likely from home medications  · Hypotension now resolved with improvement in HR

## 2022-03-03 NOTE — ASSESSMENT & PLAN NOTE
· Hx of AF on coumdain, digoxin 125 mg M-F and Coreg 12 5 BID (previously controlled on Diltiazem but switched to coreg for LE edema)   · Holding all rate control medications secondary to bradycardia   · Plan as above  · Coumadin on hold for supratherapeutic INR of 3 4, repeat INR pending this AM

## 2022-03-03 NOTE — ASSESSMENT & PLAN NOTE
· Hx of AF on coumdain, digoxin 125 mg M-F and Coreg 12 5 BID (previously controlled on Diltiazem)   · Holding all rate control medications secondary to bradycardia   · Plan as above

## 2022-03-03 NOTE — ASSESSMENT & PLAN NOTE
Lab Results   Component Value Date    HGBA1C 5 8 (H) 12/09/2021       Recent Labs     03/03/22  1610 03/03/22  2225   POCGLU 143* 99       Blood Sugar Average: Last 72 hrs:  (P) 121     · Hold home metformin   · Continue SSI coverage

## 2022-03-03 NOTE — ASSESSMENT & PLAN NOTE
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

## 2022-03-03 NOTE — ASSESSMENT & PLAN NOTE
· Secondary to bradycardia HR 30-40s and dehydration with 3 weeks loose stools and poor PO intake   · BP responsive to fluids in ER   · Will check blood cultures and was started on PO vanco with significantly elevated WBC and loose stools   · Continue IVF resuscitation for dehydration, encourage poor PO intake   · Likely large component secondary to bradycardia - likely from home medications  · Consider dopamine if hypotension persists with bradycardia

## 2022-03-03 NOTE — ASSESSMENT & PLAN NOTE
· Bradycardic 30-40s on presentation to ER with hypotension, vision changes, lightheadedness and dizziness   · Takes Digoxin 125 mcg M-F and Coreg 12 5 mg BID at home but took his old Diltiazem 120 mg XL today instead of coreg (Was previously on diltiazem for many years but was recently changed to [address] in Nov for unknown reason)  · ECG ?  AF with slow ventricular response vs idioventricular rhythm   · Found to also have hyperkalemia and PLACIDO, has been having loose stools, poor PO intake and taking diuretic   · Causing BRASH syndrome  · Digoxin level 1 1  · STAT echo   · Fluid resuscitation for hypotension, consider dopamine if hypotension persists   · Cardiology consult   · Telemetry monitoring   · Pacer pads in place

## 2022-03-03 NOTE — ASSESSMENT & PLAN NOTE
· Chronic mild LE edema   · Was recently switched off Diltiazem in light of this edema  · Pt reports appears edema is at baseline   · Takes lasix 20 mg Tues/Friday - holding secondary to PLACIDO and intravascular volume depletion

## 2022-03-03 NOTE — ASSESSMENT & PLAN NOTE
· Bradycardic 30-40s on presentation to ER with hypotension (SBP 70-80), vision changes, lightheadedness and dizziness   · Takes Digoxin 125 mcg M-F and Coreg 12 5 mg BID at home but took his old Diltiazem 120 mg XL today instead of coreg (Was previously on diltiazem but changed to coreg in Jan in light of LE edema)  · Presenting EKG AF with slow ventricular response   · Found to also have hyperkalemia and PLACIDO, has been having loose stools, poor PO intake and taking diuretic   · Consistent with BRASH syndrome  · Digoxin level 1 1  · 3/3 Echo - EF 55%, mild concentric hypertrophy, severe TR with severe RA dilation, annular dilation of RV and mildly reduced RV function  · Received total of 2L IVF on admission  · HR improved now consistent in 60s, remains in Afib   BP stabilized after IVF and continually nico as HR nico  · Continue telemetry monitoring  · Continue holding home medications  · Cardiology consult

## 2022-03-03 NOTE — ASSESSMENT & PLAN NOTE
· STAT echo completed  · EF 55%, mild concentric hypertrophy, severe TR with severe RA dilation, annular dilation of RV and mildly reduced RV function   · Careful with IVF secondary to above

## 2022-03-03 NOTE — ASSESSMENT & PLAN NOTE
· Reports loose stools 3-4 x day over past few 3 weeks (daughter in law reports since Nov), slightly low appetite and low fluid intake secondary to BPH   · Denies any bloody stools or melena, abdominal pain, N/V   · WBC 89 from baseline with CLL in 30s   · Check C diff and stool studies   · Will start PO vanco for c diff and d/c if study negative   · Rehydration with IVF

## 2022-03-03 NOTE — ASSESSMENT & PLAN NOTE
· Echo 3/3 EF 55%, mild concentric hypertrophy, severe TR with severe RA dilation, annular dilation of RV and mildly reduced RV function   · Hold on further IVFs

## 2022-03-03 NOTE — ASSESSMENT & PLAN NOTE
· AC with Coumadin 5 mg daily for A fib   · On hold in setting of supratherapeutic INR 3 40 on admission  · Repeat INR this AM pending

## 2022-03-03 NOTE — ED PROVIDER NOTES
History  Chief Complaint   Patient presents with   Theone Foots by daughter in law who found patient asleep in his car  She states patient has been c/o blurred vision and she took his BP which was low  Patient started himself back on Diltiazem     Slow Heart Rate     HR 38, patient denies CP    Hypotension     BP 88/44  Patient denies falls      79-year-old male with past history of atrial fibrillation and blood clots on Coumadin, CHF, hypertension, CLL, GERD, BPH, presents to the ED for evaluation of generalized weakness, hypotension, as well as bradycardia  Patient states that he has had intermittent loose stools over the past 3 weeks  Patient went to breakfast with his son this morning  When patient came back home he fell asleep in his car  Patient was found by his daughter-in-law who lives across the street  Daughter in-law checked it patient's pressure and was 70s/50s  Patient states that he thought carvedilol was causing his diarrhea  Instead of taking his carvedilol this morning patient took an old 120 mg diltiazem tablet  Patient is on digoxin 5 times a week  Patient took his digoxin this morning  Patient complained of some weakness and blurry vision  Patient was subsequently brought to the emergency department by daughter low for further evaluation  By the time patient arrived to the emergency department patient no longer had blurry vision  Patient continues to have weakness  Patient is compliant with all of her his other medications  In triage, patient is hypotensive as well as bradycardic with heart rate in the high 30s  History provided by:  Patient      Prior to Admission Medications   Prescriptions Last Dose Informant Patient Reported? Taking? Cholecalciferol 5000 units capsule 3/3/2022 at 0700 Self Yes Yes   Si tab daily   atorvastatin (LIPITOR) 40 mg tablet 3/2/2022 at Unknown time Self Yes Yes   Sig: Take 40 mg by mouth daily     carvedilol (COREG) 12 5 mg tablet 3/2/2022 at Unknown time Self Yes Yes   Sig: Take 12 5 mg by mouth 2 (two) times a day with meals   digoxin (Digox) 0 125 mg tablet 3/3/2022 at 0700 Family Member Yes Yes   Sig: Take 125 mcg by mouth daily Monday to Friday only   finasteride (PROSCAR) 5 mg tablet 3/3/2022 at 0700  No Yes   Sig: Take 1 tablet (5 mg total) by mouth daily   furosemide (LASIX) 20 mg tablet Past Week at Unknown time Self Yes Yes   Sig: Take 20 mg by mouth daily Take 2 times a week   On Tuesday and Friday   iron polysaccharides (FERREX) 150 mg capsule 3/3/2022 at 0700 Family Member Yes Yes   Sig: Take 150 mg by mouth 2 (two) times a day   metFORMIN (GLUCOPHAGE-XR) 500 mg 24 hr tablet 3/3/2022 at 0700  Yes Yes   Sig: Take 500 mg by mouth daily with breakfast   tamsulosin (FLOMAX) 0 4 mg 3/2/2022 at Unknown time  No Yes   Sig: Take 1 capsule (0 4 mg total) by mouth daily at bedtime   warfarin (COUMADIN) 5 mg tablet 3/3/2022 at 0700 Self Yes Yes   Sig: Take 5 mg by mouth daily        Facility-Administered Medications: None       Past Medical History:   Diagnosis Date    Asymptomatic microscopic hematuria     BPH with obstruction/lower urinary tract symptoms     BPH without obstruction/lower urinary tract symptoms     Cardiac disease     Elevated PSA     Family history of malignant neoplasm of prostate     Feeling of incomplete bladder emptying     GERD (gastroesophageal reflux disease)     Hyperlipidemia     Kidney stone     Nocturia     Other specified disorders of kidney and ureter     Personal history of urinary calculi        Past Surgical History:   Procedure Laterality Date    BACK SURGERY  12/03/2019    CYSTOSCOPY  2016    PROSTATE BIOPSY  2016    TOTAL HIP ARTHROPLASTY Left 02/2021    TRANSURETHRAL RESECTION OF PROSTATE  1998       Family History   Problem Relation Age of Onset    Other Mother         sepsis    Prostate cancer Father      I have reviewed and agree with the history as documented  E-Cigarette/Vaping    E-Cigarette Use Never User      E-Cigarette/Vaping Substances     Social History     Tobacco Use    Smoking status: Never Smoker    Smokeless tobacco: Never Used   Vaping Use    Vaping Use: Never used   Substance Use Topics    Alcohol use: No    Drug use: No       Review of Systems   Constitutional: Negative for activity change, fatigue and fever  HENT: Negative for congestion, ear discharge and sore throat  Eyes: Negative for pain and redness  Respiratory: Negative for cough, chest tightness, shortness of breath and wheezing  Cardiovascular: Negative for chest pain  Gastrointestinal: Positive for diarrhea  Negative for abdominal pain, nausea and vomiting  Endocrine: Negative for cold intolerance  Genitourinary: Negative for dysuria and urgency  Musculoskeletal: Negative for arthralgias and back pain  Neurological: Positive for weakness  Negative for dizziness and headaches  Psychiatric/Behavioral: Negative for agitation and behavioral problems  Physical Exam  Physical Exam  Vitals and nursing note reviewed  Constitutional:       Appearance: He is well-developed  HENT:      Head: Normocephalic and atraumatic  Nose: Nose normal    Eyes:      Conjunctiva/sclera: Conjunctivae normal    Cardiovascular:      Rate and Rhythm: Regular rhythm  Bradycardia present  Heart sounds: Normal heart sounds  Pulmonary:      Effort: Pulmonary effort is normal       Breath sounds: Normal breath sounds  Abdominal:      General: Bowel sounds are normal  There is no distension  Palpations: Abdomen is soft  Tenderness: There is no abdominal tenderness  Musculoskeletal:         General: Normal range of motion  Cervical back: Normal range of motion and neck supple  Skin:     General: Skin is warm  Neurological:      General: No focal deficit present  Mental Status: He is alert and oriented to person, place, and time  Comments: Generalized weakness noted without any focal neuro deficits  No visual field deficits noted  Psychiatric:         Mood and Affect: Mood normal          Behavior: Behavior normal          Thought Content:  Thought content normal          Judgment: Judgment normal          Vital Signs  ED Triage Vitals [03/03/22 1431]   Temperature Pulse Respirations Blood Pressure SpO2   97 6 °F (36 4 °C) (!) 38 18 (!) 88/44 98 %      Temp Source Heart Rate Source Patient Position - Orthostatic VS BP Location FiO2 (%)   Tympanic Monitor Sitting Left arm --      Pain Score       No Pain           Vitals:    03/03/22 1600 03/03/22 1615 03/03/22 1632 03/03/22 1702   BP: (!) 85/50 (!) 85/54 96/56 99/61   Pulse: (!) 39 (!) 39 (!) 40 (!) 40   Patient Position - Orthostatic VS: Lying Lying           Visual Acuity      ED Medications  Medications   atorvastatin (LIPITOR) tablet 40 mg (has no administration in time range)   finasteride (PROSCAR) tablet 5 mg (has no administration in time range)   tamsulosin (FLOMAX) capsule 0 4 mg (has no administration in time range)   insulin lispro (HumaLOG) 100 units/mL subcutaneous injection 1-5 Units (1 Units Subcutaneous Not Given 3/3/22 1615)   insulin lispro (HumaLOG) 100 units/mL subcutaneous injection 1-5 Units (has no administration in time range)   multi-electrolyte (ISOLYTE-S PH 7 4) bolus 1,000 mL (has no administration in time range)   vancomycin (VANCOCIN) oral solution 125 mg (has no administration in time range)   sodium chloride 0 9 % bolus 500 mL (0 mL Intravenous Stopped 3/3/22 1555)   calcium gluconate 1 g in sodium chloride 0 9% 50 mL (premix) (0 g Intravenous Stopped 3/3/22 1629)   dextrose infusion 10 % bolus (0 mL Intravenous Stopped 3/3/22 1555)   insulin regular (HumuLIN R,NovoLIN R) injection 10 Units (10 Units Intravenous Given 3/3/22 1521)   sodium chloride 0 9 % bolus 500 mL (500 mL Intravenous New Bag 3/3/22 1556)       Diagnostic Studies  Results Reviewed Procedure Component Value Units Date/Time    Phosphorus [[id number]]  (Abnormal) Collected: 03/03/22 1436    Lab Status: Final result Specimen: Blood from Arm, Right Updated: 03/03/22 1708     Phosphorus 5 3 mg/dL     COVID/FLU/RSV [[id number]]  (Normal) Collected: 03/03/22 1611    Lab Status: Final result Specimen: Nares from Nasopharyngeal Swab Updated: 03/03/22 1657     SARS-CoV-2 Negative     INFLUENZA A PCR Negative     INFLUENZA B PCR Negative     RSV PCR Negative    Narrative:      FOR PEDIATRIC PATIENTS - copy/paste COVID Guidelines URL to browser: [email or web address]  [employer]    SARS-CoV-2 assay is a Nucleic Acid Amplification assay intended for the  qualitative detection of nucleic acid from SARS-CoV-2 in nasopharyngeal  swabs  Results are for the presumptive identification of SARS-CoV-2 RNA  Positive results are indicative of infection with SARS-CoV-2, the virus  causing COVID-19, but do not rule out bacterial infection or co-infection  with other viruses  Laboratories within the [place] and its  territories are required to report all positive results to the appropriate  public health authorities  Negative results do not preclude SARS-CoV-2  infection and should not be used as the sole basis for treatment or other  patient management decisions  Negative results must be combined with  clinical observations, patient history, and epidemiological information  This test has not been FDA cleared or approved  This test has been authorized by FDA under an Emergency Use Authorization  (EUA)  This test is only authorized for the duration of time the  declaration that circumstances exist justifying the authorization of the  emergency use of an in vitro diagnostic tests for detection of SARS-CoV-2  virus and/or diagnosis of COVID-19 infection under section 564(b)(1) of  the Act, 21 U  S C  631YRM-5(A)(1), unless the authorization is terminated  or revoked sooner  The test has been validated but independent review by FDA  and CLIA is pending  Test performed using [employer] Gene[employer]pert: This RT-PCR assay targets N2,  a region unique to SARS-CoV-2  A conserved region in the E-gene was chosen  for pan-Sarbecovirus detection which includes SARS-CoV-2  HS Troponin I 2hr [[id number]] Collected: 03/03/22 1649    Lab Status:  In process Specimen: Blood from Arm, Right Updated: 03/03/22 1653    Procalcitonin with AM Reflex [[id number]]     Lab Status: No result Specimen: Blood     Fingerstick Glucose (POCT) [[id number]]  (Abnormal) Collected: 03/03/22 1610    Lab Status: Final result Updated: 03/03/22 1612     POC Glucose 143 mg/dl     Calcium, ionized [[id number]]     Lab Status: No result Specimen: Blood     Basic metabolic panel [[id number]]     Lab Status: No result Specimen: Blood     HS Troponin I 4hr [[id number]]     Lab Status: No result Specimen: Blood     Digoxin level [[id number]]  (Normal) Collected: 03/03/22 1436    Lab Status: Final result Specimen: Blood from Arm, Right Updated: 03/03/22 1537     Digoxin Lvl 1 1 ng/mL     CBC and differential [[id number]]  (Abnormal) Collected: 03/03/22 1436    Lab Status: Final result Specimen: Blood from Arm, Right Updated: 03/03/22 1523     WBC 89 17 Thousand/uL      RBC 3 27 Million/uL      Hemoglobin 9 8 g/dL      Hematocrit 33 1 %       fL      MCH 30 0 pg      MCHC 29 6 g/dL      RDW 17 3 %      MPV 10 0 fL      Platelets 74 Thousands/uL     Narrative:      Results verified by repeat    Manual Differential(PHLEBS Do Not Order) [[id number]]  (Abnormal) Collected: 03/03/22 1436    Lab Status: Final result Specimen: Blood from Arm, Right Updated: 03/03/22 1521     Segmented % 7 %      Lymphocytes % 89 %      Monocytes % 3 %      Eosinophils, % 0 %      Basophils % 0 %      Metamyelocytes% 1 %      Absolute Neutrophils 6 24 Thousand/uL      Lymphocytes Absolute 79 36 Thousand/uL      Monocytes Absolute 2 68 Thousand/uL Eosinophils Absolute 0 00 Thousand/uL      Basophils Absolute 0 00 Thousand/uL      Total Counted --     Smudge Cells Present     RBC Morphology Normal     Platelet Estimate Decreased    Narrative:      Results verified by repeat    HS Troponin 0hr (reflex protocol) [[id number]]  (Normal) Collected: 03/03/22 1436    Lab Status: Final result Specimen: Blood from Arm, Right Updated: 03/03/22 1510     hs TnI 0hr 11 ng/L     NT-BNP PRO [[id number]]  (Abnormal) Collected: 03/03/22 1436    Lab Status: Final result Specimen: Blood from Arm, Right Updated: 03/03/22 1509     NT-proBNP 4,527 pg/mL     Magnesium [[id number]]  (Normal) Collected: 03/03/22 1436    Lab Status: Final result Specimen: Blood from Arm, Right Updated: 03/03/22 1509     Magnesium 2 3 mg/dL     Stool Enteric Bacterial Panel by PCR [[id number]]     Lab Status: No result Specimen: Stool     Clostridium difficile toxin by PCR with EIA [[id number]]     Lab Status: No result Specimen: Stool     Protime-INR [[id number]]  (Abnormal) Collected: 03/03/22 1438    Lab Status: Final result Specimen: Blood from Arm, Right Updated: 03/03/22 1504     Protime 33 1 seconds      INR 3 40    APTT [[id number]]  (Abnormal) Collected: 03/03/22 1438    Lab Status: Final result Specimen: Blood from Arm, Right Updated: 03/03/22 1504     PTT 38 seconds     Comprehensive metabolic panel [[id number]]  (Abnormal) Collected: 03/03/22 1436    Lab Status: Final result Specimen: Blood from Arm, Right Updated: 03/03/22 1503     Sodium 141 mmol/L      Potassium 6 0 mmol/L      Chloride 107 mmol/L      CO2 22 mmol/L      ANION GAP 12 mmol/L      BUN 36 mg/dL      Creatinine 1 49 mg/dL      Glucose 134 mg/dL      Calcium 8 2 mg/dL      Corrected Calcium 8 8 mg/dL      AST 48 U/L      ALT 22 U/L      Alkaline Phosphatase 107 U/L      Total Protein 6 0 g/dL      Albumin 3 2 g/dL      Total Bilirubin 1 22 mg/dL      eGFR 43 ml/min/1 73sq m     Narrative:      [place] guidelines for Chronic Kidney Disease (CKD):     Stage 1 with normal or high GFR (GFR > 90 mL/min/1 73 square meters)    Stage 2 Mild CKD (GFR = 60-89 mL/min/1 73 square meters)    Stage 3A Moderate CKD (GFR = 45-59 mL/min/1 73 square meters)    Stage 3B Moderate CKD (GFR = 30-44 mL/min/1 73 square meters)    Stage 4 Severe CKD (GFR = 15-29 mL/min/1 73 square meters)    Stage 5 End Stage CKD (GFR <15 mL/min/1 73 square meters)  Note: GFR calculation is accurate only with a steady state creatinine    UA w Reflex to Microscopic w Reflex to Culture [[id number]]     Lab Status: No result Specimen: Urine                  No orders to display              Procedures  ECG 12 Lead Documentation Only    Date/Time: 3/3/2022 3:28 PM  Performed by: Hellen Sargent DO  Authorized by: Hellen Sargent DO     Indications / Diagnosis:  Weakness  ECG reviewed by me, the ED Provider: yes    Patient location:  ED  Previous ECG:     Previous ECG:  Unavailable    Comparison to cardiac monitor: Yes    Interpretation:     Interpretation: abnormal    Comments:      Idioventricular rhythm, rate 38, left axis deviation, no acute ST elevations noted, low amplitude T-waves noted throughout  No previous EKG available in our system for comparison      CriticalCare Time  Performed by: Hellen Sargent DO  Authorized by: Hellen Sargent DO     Critical care provider statement:     Critical care time (minutes):  30    Critical care time was exclusive of:  Separately billable procedures and treating other patients    Critical care was necessary to treat or prevent imminent or life-threatening deterioration of the following conditions:  Circulatory failure and cardiac failure    Critical care was time spent personally by me on the following activities:  Blood draw for specimens, obtaining history from patient or surrogate, development of treatment plan with patient or surrogate, discussions with consultants, evaluation of patient's response to treatment, examination of patient, review of old charts, re-evaluation of patient's condition, ordering and review of laboratory studies, ordering and review of radiographic studies, interpretation of cardiac output measurements and ordering and performing treatments and interventions             ED Course  ED Course as of 03/03/22 1714   Thu Mar 03, 2022   1450 Case discussed with cardiologist on-call, Dr Basil Herrera, who will come and evaluate patient  He is recommending ordering stat echo  12 Dr Basil Herrera evaluated patient at bedside  At this time patient's elevated potassium as well as recent history of soft stools may be all secondary to dehydration  This may also elevated patient's digoxin level  Digoxin level is currently pending  He recommends admit, electrolyte correction, and echo for further evaluation  Patient can go to step-down for bradycardia  1512 ICU team notified who will come and evaluate patient at bedside  1552 Patient was evaluated by ICU team   Patient will be admitted to step-down for further management  MDM  Number of Diagnoses or Management Options  PLACIDO (acute kidney injury) ([facility] )  Bradycardia  CLL (chronic lymphocytic leukemia) ([facility])  Dehydration  Diarrhea  Hyperkalemia  Hypotension  Leukocytosis  Diagnosis management comments: Obtain blood work, viral swab, stool studies, digoxin level, C diff toxin, chest x-ray  Give IV fluids, start dopamine if needed for persistent bradycardia, consult Cardiology  Plan for admission       Amount and/or Complexity of Data Reviewed  Clinical lab tests: ordered and reviewed  Tests in the radiology section of CPT®: ordered and reviewed  Tests in the medicine section of CPT®: ordered and reviewed  Review and summarize past medical records: yes  Independent visualization of images, tracings, or specimens: yes    Risk of Complications, Morbidity, and/or Mortality  General comments: Stool studies are pending  Patient had leukocytosis on CBC  Leukocytosis may be secondary to patient's CLL Diagnosis versus some underlying C diff colitis  Patient was started on p o  Vancomycin  Patient was seen by Cardiology  Digoxin level is within normal range  Patient had mild elevation in potassium level  Hyperkalemia was treated  Patient was seen by Cardiology  Patient's lab work also showed concern for dehydration  At this time patient's hypotension and elevated potassium level is likely secondary to dehydration as patient was having diarrhea over the past 3 weeks  Echo was ordered by cardiologist with EF of 55%  Patient's blood pressure improved in the ED with IV fluids  Patient continued to have bradycardia in the high 30s  This time is recommended by Cardiology to admit patient to step-down for further evaluation and management  Patient and family agrees with admission plans  Patient accepted to ICU team under step-down admission      Patient Progress  Patient progress: stable      Disposition  Final diagnoses:   Hypotension   Bradycardia   Hyperkalemia   Diarrhea   Leukocytosis   Dehydration   PLACIDO (acute kidney injury) ([facility] [address] )   CLL (chronic lymphocytic leukemia) ([facility] )     Time reflects when diagnosis was documented in both MDM as applicable and the Disposition within this note     Time User Action Codes Description Comment    3/3/2022  2:53 PM [place] Add [I95 9] Hypotension     3/3/2022  2:53 PM Strunk [place] [R00 1] Bradycardia     3/3/2022  3:53 PM [place] [E87 5] Hyperkalemia     3/3/2022  3:53 PM [place] Add [R19 7] Diarrhea     3/3/2022  3:53 PM Ferdous, Chucho [place] Add [D72 829] Leukocytosis     3/3/2022  3:53 PM [place] Add [E86 0] Dehydration     3/3/2022  3:53 PM Ferdous, Chucho Fermo Add [N17 9] PLACIDO (acute kidney injury) ([facility] [address] )     3/3/2022  4:21 PM Ferdous, Chucho Fermo Add [C91 10] CLL (chronic lymphocytic leukemia) [facility])       ED Disposition     ED Disposition Condition Date/Time Comment    Admit Stable Thu Mar 3, 2022  3:53 PM Case was discussed with ICU AP and the patient's admission status was agreed to be Admission Status: inpatient status to the service of Dr Karen Arndt  Follow-up Information    None         Patient's Medications   Discharge Prescriptions    No medications on file       No discharge procedures on file      PDMP Review     None          ED Provider  Electronically Signed by           Marium Butler DO  03/03/22 6558

## 2022-03-03 NOTE — ASSESSMENT & PLAN NOTE
· K 6 0 on admission with PLACIDO, dehydration   · In setting of BRASH syndrome with AV gavin blockers as well   · Received calcium gluconate, 10 U IV insulin and D10 in ER as well as IVF   · Repeat BMP last night with K down to 5 2, BMP pending for this AM

## 2022-03-03 NOTE — ASSESSMENT & PLAN NOTE
· Reports loose stools 3-4 x day over past few 3 weeks, slightly low appetite and low fluid intake secondary to BPH   · Denies any bloody stools or melena, abdominal pain, N/V   · WBC 89 from baseline with CLL in 30s   · Was started on PO Vanco empirically secondary to concern for Cdiff, however no BM since admission, will DC

## 2022-03-03 NOTE — ASSESSMENT & PLAN NOTE
· Chronic mild LE edema   · Pt reports appears edema is at baseline   · Takes lasix 20 mg Tues/Friday - hold for now appears dry

## 2022-03-03 NOTE — H&P
Manjeet 44  H&P- Zay Constant 1940, 79 y o  male MRN: [id number]  Unit/Bed#: ICU 01 Encounter: [id number]  Primary Care Provider: Maureen Workman DO   Date and time admitted to hospital: 3/3/2022  2:25 PM    * Symptomatic bradycardia  Assessment & Plan  · Bradycardic 30-40s on presentation to ER with hypotension, vision changes, lightheadedness and dizziness   · Takes Digoxin 125 mcg M-F and Coreg 12 5 mg BID at home but took his old Diltiazem 120 mg XL today instead of coreg (Was previously on diltiazem but changed to coreg in Jan in light of LE edema)  · ECG ?  AF with slow ventricular response vs idioventricular rhythm   · Found to also have hyperkalemia and PLACIDO, has been having loose stools, poor PO intake and taking diuretic   · Causing BRASH syndrome  · Digoxin level 1 1  · STAT echo   · Fluid resuscitation for hypotension, consider dopamine if hypotension persists   · Cardiology consult   · Telemetry monitoring   · Pacer pads in place       Hypotension  Assessment & Plan  · Secondary to bradycardia HR 30-40s and dehydration with 3 weeks loose stools and poor PO intake   · BP responsive to fluids in ER   · Will check blood cultures and was started on PO vanco with significantly elevated WBC and loose stools   · Continue IVF resuscitation for dehydration, encourage poor PO intake   · Likely large component secondary to bradycardia - likely from home medications  · Consider dopamine if hypotension persists with bradycardia       Atrial fibrillation (HCC)  Assessment & Plan  · Hx of AF on coumdain, digoxin 125 mg M-F and Coreg 12 5 BID (previously controlled on Diltiazem but switched to coreg for LE edema)   · Holding all rate control medications secondary to bradycardia   · Plan as above    Severe tricuspid valve regurgitation  Assessment & Plan  · STAT echo completed  · EF 55%, mild concentric hypertrophy, severe TR with severe RA dilation, annular dilation of RV and mildly reduced RV function   · Careful with IVF secondary to above       [facility]IDO (acute kidney injury) ([facility] )  Assessment & Plan  · Cr 1 49 with K 6  · Baseline Cr appears to be 1 1  · Pre-renal in setting of dehydration, bradycardia and hypotension along with diuretic use   · IVF resuscitation   · Strict I/O     Hyperkalemia  Assessment & Plan  · K 6 0 with PLACIDO, dehydration   · In setting of BRASH syndrome with AV gavin blockers as well   · Monitor ECG  · Received calcium gluconate, 10 U IV insulin and D10 in ER as well as IVF   · Continue maintenance fluids   · Recheck BMP and continue to medically treat as indicated     Frequent loose stools  Assessment & Plan  · Reports loose stools 3-4 x day over past few 3 weeks, slightly low appetite and low fluid intake secondary to BPH   · Denies any bloody stools or melena, abdominal pain, N/V   · WBC 89 from baseline with CLL in 30s   · Check C diff and stool studies   · Will start PO vanco for c diff and d/c if study negative   · Rehydration with IVF     Type 2 diabetes mellitus ([facility] )  Assessment & Plan  Lab Results   Component Value Date    HGBA1C 5 8 (H) 12/09/2021       Recent Labs     03/03/22  1610   POCGLU 143*       Blood Sugar Average: Last 72 hrs:  (P) 143     · Hold home metformin   · Start ISS algorithm 1 and adjust for BG goal 140-180    Bilateral lower extremity edema  Assessment & Plan  · Chronic mild LE edema   · Was recently switched off Diltiazem in light of this edema  · Pt reports appears edema is at baseline   · Takes lasix 20 mg Tues/Friday - hold for now appears dry and has PLACIDO     Hyperlipidemia  Assessment & Plan  · Continue home statin     Anticoagulated on Coumadin  Assessment & Plan  · AC with Coumadin 5 mg daily for A fib   · INR 3 40, hold dose tonight   · Recheck INR in AM       CLL (chronic lymphocytic leukemia) (HCC)  Assessment & Plan  · CLL on no tx, just under surveillance by physician   · Baseline WBC appears to be in 30s   · WBC currently elevated 89 17  · Check procal     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 and lower urinary tract symptoms  Assessment & Plan  · Hx BPH on Finasteride and Flomax   · Continue as BP tolerates  · Condom cath    -------------------------------------------------------------------------------------------------------------  Chief Complaint: Vision changes, lightheadedness, loose stools     History of Present Illness   HX and PE limited by: Anabella Casillas is a 79 y o  male PMH atrial fibrillation on coumadin, CLL on no treatment, chronic mild LE edema, BPH who presented to the ER today with bradycardia and hypotension  Reports having loose stools 3-4 x day for 3 weeks, decreased appeitite and purposefully doesnt take in a lot of liquids because of his BPH and frequency  This morning decided to take diltiazem 120 mg XL (taken off in Jan for Coreg 2/2 LE edema) instead of his Coreg as he though this medication may be cause of his loose stools  He is also on Digoxin with no recent dose changes  He went to breakfast with his son and when he was leaving starting having vision changes/blurry vision and lightheaded and dizziness but proceeded to drive home  His daughter in law found him sleeping in his car in the driveway, checked his BP which was in 13K systolic  He was brought to the ER where he was found to be bradycardic with HR 30-40s and hypotensive  He will be admitted to SDU for further workup and monitoring  History obtained from daughter in law and the patient   -------------------------------------------------------------------------------------------------------------  Dispo: Admit to Stepdown Level 1    Code Status: Level 1 - Full Code  --------------------------------------------------------------------------------------------------------------  Review of Systems   Constitutional: Positive for appetite change and fatigue  Negative for chills, diaphoresis and fever     Eyes: Positive for visual disturbance  Respiratory: Negative  Negative for cough, shortness of breath and wheezing  Cardiovascular: Positive for leg swelling (stable from baseline )  Negative for chest pain and palpitations  Gastrointestinal: Positive for diarrhea (loose stools)  Negative for abdominal distention, abdominal pain, anal bleeding, blood in stool, constipation, nausea and vomiting  Genitourinary: Positive for frequency and urgency  Skin: Negative  Neurological: Positive for dizziness and light-headedness  Negative for seizures, syncope, facial asymmetry, speech difficulty, numbness and headaches  A 12-point, complete review of systems was reviewed and negative except as stated above     Physical Exam  Vitals reviewed  Constitutional:       General: He is awake  He is not in acute distress  Appearance: He is normal weight  He is not ill-appearing, toxic-appearing or diaphoretic  Eyes:      Pupils: Pupils are equal, round, and reactive to light  Cardiovascular:      Rate and Rhythm: Bradycardia present  Rhythm irregular  Heart sounds: Normal heart sounds  Pulmonary:      Effort: Pulmonary effort is normal  No tachypnea  Breath sounds: Normal breath sounds  Abdominal:      General: There is no distension  Palpations: Abdomen is soft  Tenderness: There is no abdominal tenderness  Musculoskeletal:      Right lower le+ Edema present  Left lower le+ Edema present  Skin:     General: Skin is warm and dry  Neurological:      General: No focal deficit present  Mental Status: He is alert and oriented to person, place, and time         --------------------------------------------------------------------------------------------------------------  Vitals:   Vitals:    22 1600 22 1615 22 1632 22 1702   BP: (!) 85/50 (!) 85/54 96/56 99/61   BP Location: Left arm Right arm     Pulse: (!) 39 (!) 39 (!) 40 (!) 40   Resp: (!) 23 (!) 25 19 20   Temp:       TempSrc:       SpO2: 97% 98% 97% 97%   Weight:       Height:         Temp  Min: 97 6 °F (36 4 °C)  Max: 97 6 °F (36 4 °C)  IBW (Ideal Body Weight): 68 4 kg  Height: 5' 8" (172 7 cm)  Body mass index is 24 94 kg/m²  Laboratory and Diagnostics:  Results from last 7 days   Lab Units 03/03/22  1436   WBC Thousand/uL 89 17*   HEMOGLOBIN g/dL 9 8*   HEMATOCRIT % 33 1*   PLATELETS Thousands/uL 74*   MONO PCT % 3*     Results from last 7 days   Lab Units 03/03/22  1740 03/03/22  1436   SODIUM mmol/L 142 141   POTASSIUM mmol/L 5 2 6 0*   CHLORIDE mmol/L 109* 107   CO2 mmol/L 23 22   ANION GAP mmol/L 10 12   BUN mg/dL 37* 36*   CREATININE mg/dL 1 50* 1 49*   CALCIUM mg/dL 8 0* 8 2*   GLUCOSE RANDOM mg/dL 85 134   ALT U/L  --  22   AST U/L  --  48*   ALK PHOS U/L  --  107   ALBUMIN g/dL  --  3 2*   TOTAL BILIRUBIN mg/dL  --  1 22*     Results from last 7 days   Lab Units 03/03/22  1436   MAGNESIUM mg/dL 2 3   PHOSPHORUS mg/dL 5 3*      Results from last 7 days   Lab Units 03/03/22  1438   INR  3 40*   PTT seconds 38*              ABG:    VBG:          Micro:        EKG: Idioventricular rhythm rate 38  Imaging: I have personally reviewed pertinent reports     and I have personally reviewed pertinent films in PACS      Historical Information   Past Medical History:   Diagnosis Date    Asymptomatic microscopic hematuria     BPH with obstruction/lower urinary tract symptoms     BPH without obstruction/lower urinary tract symptoms     Cardiac disease     Elevated PSA     Family history of malignant neoplasm of prostate     Feeling of incomplete bladder emptying     GERD (gastroesophageal reflux disease)     Hyperlipidemia     Kidney stone     Nocturia     Other specified disorders of kidney and ureter     Personal history of urinary calculi      Past Surgical History:   Procedure Laterality Date    BACK SURGERY  12/03/2019    CYSTOSCOPY  2016    PROSTATE BIOPSY  2016    TOTAL HIP ARTHROPLASTY Left 2021    TRANSURETHRAL RESECTION OF PROSTATE       Social History   Social History     Substance and Sexual Activity   Alcohol Use No     Social History     Substance and Sexual Activity   Drug Use No     Social History     Tobacco Use   Smoking Status Never Smoker   Smokeless Tobacco Never Used     Exercise History: Ambulatory   Family History:   Family History   Problem Relation Age of Onset    Other Mother         sepsis    Prostate cancer Father      I have reviewed this patient's family history and commented on sigificant items within the HPI      Medications:  Current Facility-Administered Medications   Medication Dose Route Frequency    [START ON 3/4/2022] atorvastatin (LIPITOR) tablet 40 mg  40 mg Oral Daily    [START ON 3/4/2022] finasteride (PROSCAR) tablet 5 mg  5 mg Oral Daily    insulin lispro (HumaLOG) 100 units/mL subcutaneous injection 1-5 Units  1-5 Units Subcutaneous TID AC    insulin lispro (HumaLOG) 100 units/mL subcutaneous injection 1-5 Units  1-5 Units Subcutaneous HS    multi-electrolyte (ISOLYTE-S PH 7 4) bolus 1,000 mL  1,000 mL Intravenous Once    tamsulosin (FLOMAX) capsule 0 4 mg  0 4 mg Oral HS    vancomycin (VANCOCIN) oral solution 125 mg  125 mg Oral Q6H [address]     Home medications:  Prior to Admission Medications   Prescriptions Last Dose Informant Patient Reported? Taking? Cholecalciferol 5000 units capsule 3/3/2022 at 0700 Self Yes Yes   Si tab daily   atorvastatin (LIPITOR) 40 mg tablet 3/2/2022 at Unknown time Self Yes Yes   Sig: Take 40 mg by mouth daily     carvedilol (COREG) 12 5 mg tablet 3/2/2022 at Unknown time Self Yes Yes   Sig: Take 12 5 mg by mouth 2 (two) times a day with meals   digoxin (Digox) 0 125 mg tablet 3/3/2022 at 0700 Family Member Yes Yes   Sig: Take 125 mcg by mouth daily Monday to Friday only   finasteride (PROSCAR) 5 mg tablet 3/3/2022 at 0700  No Yes   Sig: Take 1 tablet (5 mg total) by mouth daily   furosemide (LASIX) 20 mg tablet Past Week at Unknown time Self Yes Yes   Sig: Take 20 mg by mouth daily Take 2 times a week   On Tuesday and Friday   iron polysaccharides (FERREX) 150 mg capsule 3/3/2022 at 0700 Family Member Yes Yes   Sig: Take 150 mg by mouth 2 (two) times a day   metFORMIN (GLUCOPHAGE-XR) 500 mg 24 hr tablet 3/3/2022 at 0700  Yes Yes   Sig: Take 500 mg by mouth daily with breakfast   tamsulosin (FLOMAX) 0 4 mg 3/2/2022 at Unknown time  No Yes   Sig: Take 1 capsule (0 4 mg total) by mouth daily at bedtime   warfarin (COUMADIN) 5 mg tablet 3/3/2022 at 0700 Self Yes Yes   Sig: Take 5 mg by mouth daily  Facility-Administered Medications: None     Allergies:  No Known Allergies    ------------------------------------------------------------------------------------------------------------  Advance Directive and Living Will:      Power of :    POLST:    ------------------------------------------------------------------------------------------------------------  Anticipated Length of Stay is > 2 midnights    Care Time Delivered:   Upon my evaluation, this patient had a high probability of imminent or life-threatening deterioration due to symptomatic bradycardia , which required my direct attention, intervention, and personal management  I have personally provided 30 minutes (1700 to 1730) of critical care time, exclusive of procedures, teaching, family meetings, and any prior time recorded by providers other than myself  Jumana Borders, PA-C        Portions of the record may have been created with voice recognition software  Occasional wrong word or "sound a like" substitutions may have occurred due to the inherent limitations of voice recognition software    Read the chart carefully and recognize, using context, where substitutions have occurred

## 2022-03-03 NOTE — ASSESSMENT & PLAN NOTE
· Cr 1 49 with K 6  · Baseline Cr appears to be 1 1  · Pre-renal in setting of dehydration, bradycardia and hypotension along with diuretic use   · IVF resuscitation   · Strict I/O

## 2022-03-03 NOTE — ASSESSMENT & PLAN NOTE
· Cr 1 49 with K 6 on admission  · Baseline Cr appears to be 1 1  · Pre-renal in setting of dehydration, bradycardia and hypotension along with diuretic use (on lasix 20mg BID Tuesday and Friday @ home)   · Received 2L IVF yesterday, hold on further IVF and encourage PO intake   · Strict I/O   · Holding home lasix  · Avoid nephrotoxic agents  · Trend serum BUN/creatinine

## 2022-03-03 NOTE — ASSESSMENT & PLAN NOTE
· CLL on no tx, just under surveillance by physician   · Baseline WBC appears to be in 30s   · WBC currently elevated 89 17  · Check procal

## 2022-03-04 LAB
ALBUMIN SERPL BCP-MCNC: 3 G/DL (ref 3.5–5)
ALP SERPL-CCNC: 103 U/L (ref 46–116)
ALT SERPL W P-5'-P-CCNC: 22 U/L (ref 12–78)
ANION GAP SERPL CALCULATED.3IONS-SCNC: 9 MMOL/L (ref 4–13)
AST SERPL W P-5'-P-CCNC: 50 U/L (ref 5–45)
BACTERIA UR QL AUTO: ABNORMAL /HPF
BILIRUB SERPL-MCNC: 1.24 MG/DL (ref 0.2–1)
BILIRUB UR QL STRIP: NEGATIVE
BUN SERPL-MCNC: 35 MG/DL (ref 5–25)
CA-I BLD-SCNC: 1.13 MMOL/L (ref 1.12–1.32)
CALCIUM ALBUM COR SERPL-MCNC: 9 MG/DL (ref 8.3–10.1)
CALCIUM SERPL-MCNC: 8.2 MG/DL (ref 8.3–10.1)
CAOX CRY URNS QL MICRO: ABNORMAL /HPF
CHLORIDE SERPL-SCNC: 108 MMOL/L (ref 100–108)
CLARITY UR: CLEAR
CO2 SERPL-SCNC: 22 MMOL/L (ref 21–32)
COLOR UR: YELLOW
CREAT SERPL-MCNC: 1.29 MG/DL (ref 0.6–1.3)
ERYTHROCYTE [DISTWIDTH] IN BLOOD BY AUTOMATED COUNT: 17.1 % (ref 11.6–15.1)
FINE GRAN CASTS URNS QL MICRO: ABNORMAL /LPF
GFR SERPL CREATININE-BSD FRML MDRD: 51 ML/MIN/1.73SQ M
GLUCOSE SERPL-MCNC: 100 MG/DL (ref 65–140)
GLUCOSE SERPL-MCNC: 103 MG/DL (ref 65–140)
GLUCOSE SERPL-MCNC: 123 MG/DL (ref 65–140)
GLUCOSE SERPL-MCNC: 89 MG/DL (ref 65–140)
GLUCOSE SERPL-MCNC: 93 MG/DL (ref 65–140)
GLUCOSE UR STRIP-MCNC: NEGATIVE MG/DL
HCT VFR BLD AUTO: 30.6 % (ref 36.5–49.3)
HGB BLD-MCNC: 9.2 G/DL (ref 12–17)
HGB UR QL STRIP.AUTO: NEGATIVE
HYALINE CASTS #/AREA URNS LPF: ABNORMAL /LPF
INR PPP: 3.32 (ref 0.84–1.19)
KETONES UR STRIP-MCNC: NEGATIVE MG/DL
LEUKOCYTE ESTERASE UR QL STRIP: NEGATIVE
MAGNESIUM SERPL-MCNC: 2.1 MG/DL (ref 1.6–2.6)
MCH RBC QN AUTO: 30.2 PG (ref 26.8–34.3)
MCHC RBC AUTO-ENTMCNC: 30.1 G/DL (ref 31.4–37.4)
MCV RBC AUTO: 100 FL (ref 82–98)
MUCOUS THREADS UR QL AUTO: ABNORMAL
NITRITE UR QL STRIP: NEGATIVE
NON-SQ EPI CELLS URNS QL MICRO: ABNORMAL /HPF
PH UR STRIP.AUTO: 5 [PH]
PHOSPHATE SERPL-MCNC: 4.3 MG/DL (ref 2.3–4.1)
PLATELET # BLD AUTO: 63 THOUSANDS/UL (ref 149–390)
PMV BLD AUTO: 9.9 FL (ref 8.9–12.7)
POTASSIUM SERPL-SCNC: 5 MMOL/L (ref 3.5–5.3)
PROCALCITONIN SERPL-MCNC: 0.13 NG/ML
PROT SERPL-MCNC: 5.8 G/DL (ref 6.4–8.2)
PROT UR STRIP-MCNC: ABNORMAL MG/DL
PROTHROMBIN TIME: 32.5 SECONDS (ref 11.6–14.5)
RBC # BLD AUTO: 3.05 MILLION/UL (ref 3.88–5.62)
RBC #/AREA URNS AUTO: ABNORMAL /HPF
SODIUM SERPL-SCNC: 139 MMOL/L (ref 136–145)
SP GR UR STRIP.AUTO: 1.02 (ref 1–1.03)
UROBILINOGEN UR QL STRIP.AUTO: 0.2 E.U./DL
WBC # BLD AUTO: 90.04 THOUSAND/UL (ref 4.31–10.16)
WBC #/AREA URNS AUTO: ABNORMAL /HPF

## 2022-03-04 PROCEDURE — 97166 OT EVAL MOD COMPLEX 45 MIN: CPT

## 2022-03-04 PROCEDURE — 83735 ASSAY OF MAGNESIUM: CPT | Performed by: PHYSICIAN ASSISTANT

## 2022-03-04 PROCEDURE — 82948 REAGENT STRIP/BLOOD GLUCOSE: CPT

## 2022-03-04 PROCEDURE — 99232 SBSQ HOSP IP/OBS MODERATE 35: CPT | Performed by: INTERNAL MEDICINE

## 2022-03-04 PROCEDURE — 99232 SBSQ HOSP IP/OBS MODERATE 35: CPT | Performed by: ANESTHESIOLOGY

## 2022-03-04 PROCEDURE — 82330 ASSAY OF CALCIUM: CPT | Performed by: PHYSICIAN ASSISTANT

## 2022-03-04 PROCEDURE — 85610 PROTHROMBIN TIME: CPT | Performed by: PHYSICIAN ASSISTANT

## 2022-03-04 PROCEDURE — 97163 PT EVAL HIGH COMPLEX 45 MIN: CPT | Performed by: PHYSICAL THERAPIST

## 2022-03-04 PROCEDURE — 84145 PROCALCITONIN (PCT): CPT | Performed by: NURSE PRACTITIONER

## 2022-03-04 PROCEDURE — 80053 COMPREHEN METABOLIC PANEL: CPT | Performed by: NURSE PRACTITIONER

## 2022-03-04 PROCEDURE — 84100 ASSAY OF PHOSPHORUS: CPT | Performed by: PHYSICIAN ASSISTANT

## 2022-03-04 PROCEDURE — 85027 COMPLETE CBC AUTOMATED: CPT | Performed by: PHYSICIAN ASSISTANT

## 2022-03-04 RX ADMIN — Medication 125 MG: at 05:15

## 2022-03-04 RX ADMIN — ATORVASTATIN CALCIUM 40 MG: 40 TABLET, FILM COATED ORAL at 08:11

## 2022-03-04 RX ADMIN — TAMSULOSIN HYDROCHLORIDE 0.4 MG: 0.4 CAPSULE ORAL at 21:23

## 2022-03-04 RX ADMIN — Medication 12.5 MG: at 21:23

## 2022-03-04 RX ADMIN — FINASTERIDE 5 MG: 5 TABLET, FILM COATED ORAL at 08:11

## 2022-03-04 RX ADMIN — Medication 12.5 MG: at 09:40

## 2022-03-04 NOTE — PLAN OF CARE
Problem: SAFETY ADULT  Goal: Patient will remain free of falls  Description: INTERVENTIONS:  - Educate patient/family on patient safety including physical limitations  - Instruct patient to call for assistance with activity   - Consult OT/PT to assist with strengthening/mobility   - Keep Call bell within reach  - Keep bed low and locked with side rails adjusted as appropriate  - Keep care items and personal belongings within reach  - Initiate and maintain comfort rounds  - Make Fall Risk Sign visible to staff  - Offer Toileting every 2  Hours, in advance of need  - Initiate/Maintain bed alarm  - Obtain necessary fall risk management equipment:   - Apply yellow socks and bracelet for high fall risk patients  - Consider moving patient to room near nurses station  Outcome: Progressing  Goal: Maintain or return to baseline ADL function  Description: INTERVENTIONS:  -  Assess patient's ability to carry out ADLs; assess patient's baseline for ADL function and identify physical deficits which impact ability to perform ADLs (bathing, care of mouth/teeth, toileting, grooming, dressing, etc )  - Assess/evaluate cause of self-care deficits   - Assess range of motion  - Assess patient's mobility; develop plan if impaired  - Assess patient's need for assistive devices and provide as appropriate  - Encourage maximum independence but intervene and supervise when necessary  - Involve family in performance of ADLs  - Assess for home care needs following discharge   - Consider OT consult to assist with ADL evaluation and planning for discharge  - Provide patient education as appropriate  Outcome: Progressing  Goal: Maintains/Returns to pre admission functional level  Description: INTERVENTIONS:  - Perform BMAT or MOVE assessment daily    - Set and communicate daily mobility goal to care team and patient/family/caregiver     - Collaborate with rehabilitation services on mobility goals if consulted  - Perform Range of Motion 4 times a day   - Out of bed for toileting  - Record patient progress and toleration of activity level   Outcome: Progressing     Problem: DISCHARGE PLANNING  Goal: Discharge to home or other facility with appropriate resources  Description: INTERVENTIONS:  - Identify barriers to discharge w/patient and caregiver  - Arrange for needed discharge resources and transportation as appropriate  - Identify discharge learning needs (meds, wound care, etc )  - Arrange for interpretive services to assist at discharge as needed  - Refer to Case Management Department for coordinating discharge planning if the patient needs post-hospital services based on physician/advanced practitioner order or complex needs related to functional status, cognitive ability, or social support system  Outcome: Progressing     Problem: Knowledge Deficit  Goal: Patient/family/caregiver demonstrates understanding of disease process, treatment plan, medications, and discharge instructions  Description: Complete learning assessment and assess knowledge base  Interventions:  - Provide teaching at level of understanding  - Provide teaching via preferred learning methods  Outcome: Progressing     Problem: Nutrition/Hydration-ADULT  Goal: Nutrient/Hydration intake appropriate for improving, restoring or maintaining nutritional needs  Description: Monitor and assess patient's nutrition/hydration status for malnutrition  Collaborate with interdisciplinary team and initiate plan and interventions as ordered  Monitor patient's weight and dietary intake as ordered or per policy  Utilize nutrition screening tool and intervene as necessary  Determine patient's food preferences and provide high-protein, high-caloric foods as appropriate       INTERVENTIONS:  - Monitor oral intake, urinary output, labs, and treatment plans  - Assess nutrition and hydration status and recommend course of action  - Evaluate amount of meals eaten  - Assist patient with eating if necessary   - Allow adequate time for meals  - Recommend/ encourage appropriate diets, oral nutritional supplements, and vitamin/mineral supplements  - Order, calculate, and assess calorie counts as needed  - Recommend, monitor, and adjust tube feedings and TPN/PPN based on assessed needs  - Assess need for intravenous fluids  - Provide specific nutrition/hydration education as appropriate  - Include patient/family/caregiver in decisions related to nutrition  Outcome: Progressing

## 2022-03-04 NOTE — OCCUPATIONAL THERAPY NOTE
Occupational Therapy Evaluation       03/04/22 1445   Note Type   Note type Evaluation   Restrictions/Precautions   Other Precautions Chair Alarm; Bed Alarm; Fall Risk;Multiple lines;Hard of hearing   Pain Assessment   Pain Assessment Tool 0-10   Pain Score No Pain   Home Living   Type of Home House   Home Layout Two level;Bed/bath upstairs   Bathroom Shower/Tub Tub/shower unit   Bathroom Toilet Standard   Bathroom Equipment Other (Comment)  (None)   Home Equipment Walker;Cane  (Does not currently use)   Additional Comments Patient presented to hospital with bradycardia and hypotension   Prior Function   Level of [place] Independent with ADLs and functional mobility   Lives With Spouse   Receives Help From Family   ADL Assistance Independent   IADLs Independent   Falls in the last 6 months 0   Vocational Retired   Comments Patient reports PTA independent in ADLs and mobility without AD   ADL   Eating Assistance 7  Independent   Grooming Assistance 7  Independent   UB Bathing Assistance 5  Supervision/Setup   LB Bathing Assistance 4  Minimal Assistance   [address] 5  Supervision/Setup    [address]  5  Supervision/Setup   Bed Mobility   Additional Comments Pt received seated in bedside chair   Transfers   Sit to Stand 5  Supervision   Stand to [place] [id number] transfer 5  Supervision   Additional items Commode  (Ambulatory transfer, no AD)   Functional Mobility   Functional Mobility 5  Supervision   Additional Comments Pt ambulated short household distance in room without AD   Balance   Static Sitting Good   Dynamic Sitting Fair +   Static Standing Fair +   Dynamic Standing Fair   Activity Tolerance   Activity Tolerance Patient limited by fatigue   RUE Assessment   RUE Assessment WFL   LUE Assessment   LUE Assessment WFL   Cognition   Overall Cognitive Status WFL   Arousal/Participation Alert; Cooperative   Attention Within functional limits   Orientation Level Oriented X4   Memory Within functional limits   Following Commands Follows all commands and directions without difficulty   Assessment   Limitation Decreased ADL status; Decreased UE strength;Decreased Safe judgement during ADL;Decreased endurance;Decreased self-care trans;Decreased high-level ADLs   Prognosis Good   Assessment Patient evaluated by Occupational Therapy  Patient admitted with Symptomatic bradycardia  The patients occupational profile, medical and therapy history includes a extensive additional review of physical, cognitive, or psychosocial history related to current functional performance  Comorbidities affecting functional mobility and ADLS include: cardiac disease, GERD, HLD< BPH  Prior to admission, patient was independent with functional mobility without assistive device, independent with ADLS and independent with IADLS  The evaluation identifies the following performance deficits: weakness, impaired balance, decreased endurance, increased fall risk, new onset of impairment of functional mobility, decreased ADLS, decreased IADLS, decreased activity tolerance, decreased safety awareness, impaired judgement and impaired psychosocial skills, that result in activity limitations and/or participation restrictions  This evaluation requires clinical decision making of high complexity, because the patient presents with comorbidites that affect occupational performance and required significant modification of tasks or assistance with consideration of multiple treatment options  The Barthel Index was used as a functional outcome tool presenting with a score of Barthel Index Score: 60, indicating moderate limitations of functional mobility and ADLS  The patient's raw score on the -PAC Daily Activity inpatient short form is 20, standardized score is 42 03, greater than 39 4  Patients at this level are likely to benefit from DC to home   Please refer to the recommendation of the Occupational Therapist for safe DC planning  Patient will benefit from skilled Occupational Therapy services to address above deficits and facilitate a safe return to prior level of function  Goals   Patient Goals To go home   STG Time Frame 1-3   Short Term Goal  Goals established to promote Patient Goals: To go home:  Patient will increase standing tolerance to 10 minutes during ADL task to decrease assistance level and decrease fall risk; Patient will increase bed mobility to independent in preparation for ADLS and transfers; Patient will increase functional mobility to and from bathroom with no assistive device with supervision to increase performance with ADLS and to use a toilet; Patient will tolerate 10 minutes of UE ROM/strengthening to increase general activity tolerance and performance in ADLS/IADLS; Patient will improve functional activity tolerance to 10 minutes of sustained functional tasks to increase participation in basic self-care and decrease assistance level;  Patient will be able to to verbalize understanding and perform energy conservation/proper body mechanics during ADLS and functional mobility at least 75% of the time with minimal cueing to decrease signs of fatigue and increase stamina to return to prior level of function; Patient will increase dynamic sitting balance to good to improve the ability to sit at edge of bed or on a chair for ADLS;  Patient will increase dynamic standing balance to fair+ to improve postural stability and decrease fall risk during standing ADLS and transfers      LTG Time Frame 3-7   Long Term Goal Patient will increase standing tolerance to 15 minutes during ADL task to decrease assistance level and decrease fall risk; Patient will increase functional mobility to and from bathroom with rolling walker independently to increase performance with ADLS and to use a toilet; Patient will tolerate 15 minutes of UE ROM/strengthening to increase general activity tolerance and performance in ADLS/IADLS; Patient will improve functional activity tolerance to 15 minutes of sustained functional tasks to increase participation in basic self-care and decrease assistance level;  Patient will be able to to verbalize understanding and perform energy conservation/proper body mechanics during ADLS and functional mobility at least 90% of the time with no cueing to decrease signs of fatigue and increase stamina to return to prior level of function; Patient will increase static/dynamic standing balance to good to improve postural stability and decrease fall risk during standing ADLS and transfers  Pt will score >/= 24/24 on AM-PAC Daily Activity Inpatient scale to promote safe independence with ADLs and functional mobility; Pt will score >/= 100/100 on Barthel Index in order to decrease caregiver assistance needed and increase ability to perform ADLs and functional mobility  Functional Transfer Goals   Pt Will Perform All Functional Transfers   (LTG independent)   ADL Goals   Pt Will Perform Bathing   (STG supervision, LTG independent)   Pt Will Perform UE Dressing   (LTG independent)   Pt Will Perform LE Dressing   (STG supervision, LTG independent)   Pt Will Perform Toileting   (LTG independent)   Plan   Treatment Interventions ADL retraining;Functional transfer training;UE strengthening/ROM; Endurance training;Patient/family training;Equipment evaluation/education; Compensatory technique education;Continued evaluation; Energy conservation; Activityengagement   Goal Expiration Date 03/18/22   OT Frequency 3-5x/wk   Recommendation   OT Discharge Recommendation No rehabilitation needs   AM-PAC Daily Activity Inpatient   Lower Body Dressing 3   Bathing 3   Toileting 3   Upper Body Dressing 3   Grooming 4   Eating 4   Daily Activity Raw Score 20   Daily Activity Standardized Score (Calc for Raw Score >=11) 42 03   AM-PAC Applied Cognition Inpatient   Following a Speech/Presentation 4 Understanding Ordinary Conversation 4   Taking Medications 4   Remembering Where Things Are Placed or Put Away 4   Remembering List of 4-5 Errands 4   Taking Care of Complicated Tasks 4   Applied Cognition Raw Score 24   Applied Cognition Standardized Score 62 21   Barthel Index   Feeding 10   Bathing 0   Grooming Score 5   Dressing Score 5   Bladder Score 10   Bowels Score 10   Toilet Use Score 5   Transfers (Bed/Chair) Score 10   Mobility (Level Surface) Score 0   Stairs Score 5   Barthel Index Score 61   Licensure   [place] License Number  Yg Lopez, OTR/L [id number]

## 2022-03-04 NOTE — PROGRESS NOTES
Pastoral Care Progress Note    3/4/2022  Patient: Toya Bee : 1940  Admission Date & Time: 3/3/2022 1425  MRN: [id number] CSN: [id number]                     Chaplaincy Interventions Utilized:   Relationship Building: Cultivated a relationship of care and support  Patient said that he got dehydrated and that his blood pressure dropped as a result  Patient is quite hard of hearing and needs to be spoken to loudly  He welcomed a prayer for a return to health     Ritual: Provided prayer     22 0900   Clinical Encounter Type   Visited With Patient   Routine Visit Introduction   Referral To   (census/rounds)   Latter-day Encounters   Latter-day Needs Prayer

## 2022-03-04 NOTE — PROGRESS NOTES
Progress Note - Cardiology   [facility]     Johana Dia 79 y o  male MRN: [id number]  : 1940  Unit/Bed#: ICU 01 Encounter: [id number]    Assessment and Plan:     1  Atrial fibrillation  Patient has permanent atrial fibrillation  He was admitted with AFib with very slow ventricular rate  Most likely etiology was patient continue taking his medication in the setting of acute kidney injury and dehydration  Now his heart rate has improved  We can start him back on 1 of the AV node blocking drug  Okay to start metoprolol 12 5 twice a day  Will need adjustment of his medications        2  Dizziness and lightheadedness due to volume depletion and bradycardia  Now resolved     3  Severe tricuspid regurgitation  Patient has known severe tricuspid regurgitation by echo  RV is markedly dilated  He had a normal LV systolic function  Echo reviewed      4  Dyslipidemia  Okay to start back on statins      5  History of benign prostate hypertrophy and TURP      6  Acute kidney injury with hyperkalemia now resolved  7  Diabetes mellitus  Management as per medical team    8  CLL  Patient has elevated white cell count  Plan  Okay to transfer from ICU  Will start low-dose beta-blocker  Okay to put back on statin  Monitor input output and electrolytes  Slowly titrate up beta-blockers and mean to do's digoxin based on patient's heart rate  Compression stockings due to severe tricuspid regurgitation  Concur Rx provided  Spoke to ICU team    Subjective / Objective:   Patient seen and evaluated  Out of bed to chair feels back to baseline  Denies any dizziness lightheadedness heart rate has improved  AV node blocking drugs are on hold  Potassium has improved  He admits he was not drinking and he had diarrhea  Hard of hearing    Vitals: Blood pressure 139/63, pulse 74, temperature 98 °F (36 7 °C), temperature source Temporal, resp   rate 18, height 5' 8" (1 727 m), weight 78 2 kg (172 lb 6 4 oz), SpO2 95 %  Vitals:    03/03/22 1545 03/04/22 0536   Weight: 74 4 kg (164 lb) 78 2 kg (172 lb 6 4 oz)     Body mass index is 26 21 kg/m²  BP Readings from Last 3 Encounters:   03/04/22 139/63   07/26/21 130/64   07/23/20 138/70     Orthostatic Blood Pressures      Most Recent Value   Blood Pressure 139/63 filed at 03/04/2022 3584   Patient Position - Orthostatic VS Sitting filed at 03/04/2022 2549        I/O       03/02 0701  03/03 0700 03/03 0701  03/04 0700 03/04 0701  03/05 0700    I V  (mL/kg)  1000 (12 8)     IV Piggyback  150     Total Intake(mL/kg)  1150 (14 7)     Urine (mL/kg/hr)  550 500 (1 8)    Total Output  550 500    Net  +600 -500               Invasive Devices  Report    Peripheral Intravenous Line            Peripheral IV 03/03/22 Left Forearm <1 day    Peripheral IV 03/03/22 Right Antecubital <1 day                  Intake/Output Summary (Last 24 hours) at 3/4/2022 1032  Last data filed at 3/4/2022 0846  Gross per 24 hour   Intake 1150 ml   Output 1050 ml   Net 100 ml         Physical Exam:   Physical Exam    Neurologic:  Alert & oriented x 3,  no focal deficits noted   Constitutional:  Well developed, well nourished,  With no acute distress  Eyes:  PERRL, conjunctiva normal   HENT:  Atraumatic, external ears normal, nose normal,   NECK: Normal range of motion, no tenderness, neck is supple , No JVP  Respiratory:  Bilateral air entry mostly clear to auscultation  Cardiovascular: S1-S2 irregular with 3/6 pansystolic murmur left lower sternal border  GI:  Soft, nondistended, normal bowel sounds, nontender, no hepatosplenomegaly appreciated  Musculoskeletal:  No tenderness, no deformities      Extremities:  No significant edema  Psychiatric:  Speech and behavior appropriate             Medications/ Allergies:     Current Facility-Administered Medications   Medication Dose Route Frequency Provider Last Rate    atorvastatin  40 mg Oral Daily Solis Brown PA-C      finasteride 5 mg Oral Daily Dawnarobin Osiel [place], [place]      insulin lispro  1-5 Units Subcutaneous TID AC [place], [place]      insulin lispro  1-5 Units Subcutaneous HS [place], [place]      metoprolol tartrate  12 5 mg Oral Q12H [address] [place], [place]      tamsulosin  0 4 mg Oral HS [place] ROXANNE Lorenzo          No Known Allergies    VTE Pharmacologic Prophylaxis:   INR is elevated    Labs:   Troponins:  Results from last 7 days   Lab Units 03/03/22  1740 03/03/22  1649   HSTNI D2 ng/L  --  -1   HSTNI D4 ng/L -1  --          CBC with diff:  Results from last 7 days   Lab Units 03/04/22  0436 03/03/22  1436   WBC Thousand/uL 90 04* 89 17*   HEMOGLOBIN g/dL 9 2* 9 8*   HEMATOCRIT % 30 6* 33 1*   MCV fL 100* 101*   PLATELETS Thousands/uL 63* 74*   MCH pg 30 2 30 0   MCHC g/dL 30 1* 29 6*   RDW % 17 1* 17 3*   MPV fL 9 9 10 0       CMP:  Results from last 7 days   Lab Units 03/04/22  0436 03/03/22  1740 03/03/22  1436   SODIUM mmol/L 139 142 141   POTASSIUM mmol/L 5 0 5 2 6 0*   CHLORIDE mmol/L 108 109* 107   CO2 mmol/L 22 23 22   ANION GAP mmol/L 9 10 12   BUN mg/dL 35* 37* 36*   CREATININE mg/dL 1 29 1 50* 1 49*   CALCIUM mg/dL 8 2* 8 0* 8 2*   AST U/L 50*  --  48*   ALT U/L 22  --  22   ALK PHOS U/L 103  --  107   TOTAL PROTEIN g/dL 5 8*  --  6 0*   ALBUMIN g/dL 3 0*  --  3 2*   TOTAL BILIRUBIN mg/dL 1 24*  --  1 22*   EGFR ml/min/1 73sq m 51 42 43       Magnesium:  Results from last 7 days   Lab Units 03/04/22  0436 03/03/22  1436   MAGNESIUM mg/dL 2 1 2 3     Coags:  Results from last 7 days   Lab Units 03/04/22  0436 03/03/22  1438   PTT seconds  --  38*   INR  3 32* 3 40*     NT-proBNP:   Recent Labs     03/03/22  1436   NTBNP 4,527*        Imaging & Testing   I have personally reviewed pertinent reports  Echo complete w/ contrast if indicated    Result Date: 3/3/2022  Narrative: Blanco Martinez  Left Ventricle: Left ventricular cavity size is normal  Wall thickness is mildly increased  There is mild concentric hypertrophy  The left ventricular ejection fraction is 55% by visual estimation  Systolic function is normal  Wall motion is normal    Right Ventricle: Right ventricular cavity size is moderately dilated  Annular dilatation of RV noted Systolic function is mildly reduced  Wall thickness is increased    Left Atrium: The atrium is moderately dilated    Right Atrium: The atrium is severely dilated    Aortic Valve: There is mild regurgitation    Mitral Valve: There is mild annular calcification  There is mild regurgitation    Tricuspid Valve: There is severe regurgitation  Hard to accurately calculated pulmonary artery pressure due to wide open TR but appears to be around 35-40 mmHg  EKG / Monitor: Personally reviewed  Currently on the monitor atrial fibrillation heart rate around 65-80 beats per minute  Dr Morris Coronado MD [facility]      "This note has been constructed using a voice recognition system  Therefore there may be syntax, spelling, and/or grammatical errors   Please call if you have any questions  "

## 2022-03-04 NOTE — QUICK NOTE
Patient's daughter-in-law, Evin Joshi, updated via telephone  Current clinical status discussed and care plan reviewed  All questions answered at this time

## 2022-03-04 NOTE — PHYSICAL THERAPY NOTE
PT EVALUATION     Time In: 1418  Time Out: 1430       03/04/22 1418   PT Last Visit   PT Visit Date 03/04/22   Note Type   Note type Evaluation   Pain Assessment   Pain Assessment Tool 0-10   Pain Score No Pain   Restrictions/Precautions   Other Precautions Chair Alarm; Bed Alarm; Fall Risk   Home Living   Type of [address] Two level;Bed/bath upstairs   Bathroom Shower/Tub Tub/shower unit   [address];Walker   Prior Function   Level of [place] Independent with ADLs and functional mobility   Lives With Spouse   Receives Help From Family   ADL Assistance Independent   IADLs Independent   Falls in the last 6 months 0   Vocational Retired   General   Additional Pertinent History Pt admitted for atrial fibrillation; patient hard of hearing   Family/Caregiver Present Yes  (Family present for session today)   Cognition   Overall Cognitive Status WFL   Arousal/Participation Cooperative   Orientation Level Oriented X4   Memory Within functional limits   Following Commands Follows all commands and directions without difficulty   Comments Patient hard of hearing   Subjective   Subjective "I'm okay"   RLE Assessment   RLE Assessment WFL   LLE Assessment   LLE Assessment WFL   Bed Mobility   Additional Comments Pt seated in bedside chair upon PT arrival   Transfers   Sit to Stand 5  Supervision   Stand to Sit 5  Supervision   Additional Comments Pt returned to bedside chair at end of session today  Ambulation/Elevation   Gait pattern Wide PATRICE; Short stride   Gait Assistance 5  Supervision   Assistive Device None   Distance 10 feet x 2   Stair Management Assistance Not tested   Balance   Static Sitting Good   Dynamic Sitting Fair +   Static Standing Fair +   Dynamic Standing Fair   Ambulatory Fair   Activity Tolerance   Activity Tolerance Patient limited by pain  (Pt reporting LE pain in knee)   Medical Staff Made Aware RN Gerhardt Caper   Assessment   Prognosis Good   Problem List Decreased strength;Decreased range of motion;Decreased endurance; Impaired balance;Decreased mobility;Pain   Assessment Patient seen for Physical Therapy evaluation  Patient admitted with Symptomatic bradycardia  Comorbidities affecting patient's physical performance include: hypotension, bradycardia, SIRS, diarrhea, CLL, chronic thrombocytopenia, pulmonary HTN, BPD, dyslipidemia, Afib, anemia, diabetes and hypertension  Personal factors affecting patient at time of initial evaluation include: lives in 2 story house, inability to navigate community distances, hearing impairments, inability to perform ADLS and inability to perform IADLS   Prior to admission, patient was independent with functional mobility without assistive device, independent with ADLS, independent with IADLS, living with spouse in a multi- level home with 0 steps to enter and retired  Please find objective findings from Physical Therapy assessment regarding body systems outlined above with impairments and limitations including weakness, decreased ROM, impaired balance, decreased endurance, gait deviations, pain, decreased activity tolerance, decreased functional mobility tolerance and fall risk  The Barthel Index was used as a functional outcome tool presenting with a score of 70   today indicating moderate limitations of functional mobility and ADLS  Patient's clinical presentation is currently evolving as seen in patient's presentation of increased fall risk, new onset of impairment of functional mobility, decreased endurance and new onset of weakness  Pt would benefit from continued Physical Therapy treatment to address deficits as defined above and maximize level of functional mobility  As demonstrated by objective findings, the assigned level of complexity for this evaluation is high  The patient's AM-[facility] Basic Mobility Inpatient Short Form Raw Score is 20   A Raw score of greater than 16 suggests the patient may benefit from discharge to home  Please also refer to the recommendation of the Physical Therapist for safe discharge planning  Goals   Patient Goals "go home"   STG Expiration Date 03/11/22   Short Term Goal #1 1)  Pt will perform bed mobility with mod I demonstrating appropriate technique  in order to improve function  2)  Perform all transfers with mod I demonstrating safe and appropriate technique  in order to improve ability to negotiate safely in home environment  3) Amb with least restrictive AD > 150'x1 with mod I in order to demonstrate ability to negotiate in home environment  4)  Improve overall strength and balance 1/2 grade in order to optimize ability to perform functional tasks and reduce fall risk  5) Increase activity tolerance to 45 minutes in order to improve endurance to functional tasks  6)  Negotiate stairs using most appropriate technique and S in order to be able to negotiate safely in home environment  7) PT for ongoing patient and family/caregiver education, DME needs and d/c planning in order to promote highest level of function in least restrictive environment  LTG Expiration Date 03/18/22   Plan   Treatment/Interventions ADL retraining;Functional transfer training;LE strengthening/ROM; Therapeutic exercise; Endurance training;Patient/family training;Bed mobility;Gait training;Spoke to nursing   PT Frequency Other (Comment)  (5x/wk)   Recommendation   PT Discharge Recommendation Home with home health rehabilitation   Additional Comments Patient would benefit from OP PT to address chronic pain and weakness in bilateral LE  Follow patient while in hospital to determine if HHPT or OP PT following discharge      AM-PAC Basic Mobility Inpatient   Turning in Bed Without Bedrails 4   Lying on Back to Sitting on Edge of Flat Bed 4   Moving Bed to Chair 3   Standing Up From Chair 3   Walk in Room 3   Climb 3-5 Stairs 3   Basic Mobility Inpatient Raw Score 20   Basic Mobility Standardized Score 43 99   Highest Level Of Mobility   JH-HLM Goal 6: Walk 10 steps or more   JH-HLM Highest Level of Mobility 6: Walk 10 steps or more   JH-HLM Goal Achieved Yes   Barthel Index   Feeding 10   Bathing 5   Grooming Score 5   Dressing Score 10   Bladder Score 10   Bowels Score 10   Toilet Use Score 10   Transfers (Bed/Chair) Score 10   Mobility (Level Surface) Score 0   Stairs Score 0   Barthel Index Score 70   End of Consult   Patient Position at End of Consult Bedside chair;Bed/Chair alarm activated; All needs within reach   [address] Number  Charla Raymundo PT, DPT [id number]

## 2022-03-04 NOTE — PLAN OF CARE
Problem: PHYSICAL THERAPY ADULT  Goal: Performs mobility at highest level of function for planned discharge setting  See evaluation for individualized goals  Description: Treatment/Interventions: ADL retraining,Functional transfer training,LE strengthening/ROM,Therapeutic exercise,Endurance training,Patient/family training,Bed mobility,Gait training,Spoke to nursing          See flowsheet documentation for full assessment, interventions and recommendations  Note: Prognosis: Good  Problem List: Decreased strength,Decreased range of motion,Decreased endurance,Impaired balance,Decreased mobility,Pain  Assessment: Patient seen for Physical Therapy evaluation  Patient admitted with Symptomatic bradycardia  Comorbidities affecting patient's physical performance include: hypotension, bradycardia, SIRS, diarrhea, CLL, chronic thrombocytopenia, pulmonary HTN, BPD, dyslipidemia, Afib, anemia, diabetes and hypertension  Personal factors affecting patient at time of initial evaluation include: lives in 2 story house, inability to navigate community distances, hearing impairments, inability to perform ADLS and inability to perform IADLS   Prior to admission, patient was independent with functional mobility without assistive device, independent with ADLS, independent with IADLS, living with spouse in a multi- level home with 0 steps to enter and retired  Please find objective findings from Physical Therapy assessment regarding body systems outlined above with impairments and limitations including weakness, decreased ROM, impaired balance, decreased endurance, gait deviations, pain, decreased activity tolerance, decreased functional mobility tolerance and fall risk  The Barthel Index was used as a functional outcome tool presenting with a score of 70   today indicating moderate limitations of functional mobility and ADLS    Patient's clinical presentation is currently evolving as seen in patient's presentation of increased fall risk, new onset of impairment of functional mobility, decreased endurance and new onset of weakness  Pt would benefit from continued Physical Therapy treatment to address deficits as defined above and maximize level of functional mobility  As demonstrated by objective findings, the assigned level of complexity for this evaluation is high  The patient's AM-[facility] Basic Mobility Inpatient Short Form Raw Score is 20  A Raw score of greater than 16 suggests the patient may benefit from discharge to home  Please also refer to the recommendation of the Physical Therapist for safe discharge planning  PT Discharge Recommendation: Home with home health rehabilitation          See flowsheet documentation for full assessment

## 2022-03-04 NOTE — CASE MANAGEMENT
Case Management Assessment & Discharge Planning Note    Patient name Lorenzo Khan  Location ICU /ICU  MRN [id number]  : 1940 Date 3/4/2022       Current Admission Date: 3/3/2022  Current Admission Diagnosis:Symptomatic bradycardia   Patient Active Problem List    Diagnosis Date Noted    Atrial fibrillation ([place] [address] ) 2022    Symptomatic bradycardia 2022    Hypotension 2022    Hyperkalemia 2022    Frequent loose stools 2022    CLL (chronic lymphocytic leukemia) ([place] [address] ) 2022    Anticoagulated on Coumadin 2022    Hyperlipidemia 2022    Bilateral lower extremity edema 2022    Type 2 diabetes mellitus ([place] [address] ) 2022    PLACIDO (acute kidney injury) ([place] [address] ) 2022    Severe tricuspid valve regurgitation 2022    Hard of hearing 2022    Dribbling following urination 2021    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 and lower urinary tract symptoms 2021      LOS (days): 1  Geometric Mean LOS (GMLOS) (days): 3 10  Days to GMLOS:2 4     OBJECTIVE:    Risk of Unplanned Readmission Score: 18         Current admission status: Inpatient       Preferred Pharmacy:   [facility] [address] SABIHA JORGENSEN [facility] [address] [id number]  Phone: [phone] Fax: [phone]    Primary Care Provider: Ester Avendano DO    Primary Insurance: MEDICARE  Secondary Insurance: BLUE CROSS    ASSESSMENT:  [place] [address] Agents    There are no active Health Care Agents on file  Patient Information  Admitted from[de-identified] Home  Mental Status: Alert  During Assessment patient was accompanied by: Not accompanied during assessment  Assessment information provided by[de-identified] Spouse  Primary Caregiver: Self  Support Systems: Self,Spouse/significant other  [place] of Residence: [address] [place] [address] do you live in?: [place]  Home entry access options   Select all that apply : No steps to enter home  Do the steps have railings?: Yes  Type of Current Residence: 2 story home  Upon entering residence, is there a bedroom on the main floor (no further steps)?: No  A bedroom is located on the following floor levels of residence (select all that apply):: 2nd Floor  Upon entering residence, is there a bathroom on the main floor (no further steps)?: No  Indicate which floors of current residence have a bathroom (select all the apply):: 2nd Floor  Number of steps to 2nd floor from main floor: One Flight  In the last 12 months, was there a time when you were not able to pay the mortgage or rent on time?: No  In the last 12 months, how many places have you lived?: 1  In the last 12 months, was there a time when you did not have a steady place to sleep or slept in a shelter (including now)?: No  Homeless/housing insecurity resource given?: N/A  Living Arrangements: Lives w/ Spouse/significant other  Is patient a ?: No    Activities of Daily Living Prior to Admission  Functional Status: Independent  Completes ADLs independently?: Yes  Ambulates independently?: Yes  Does patient use assisted devices?: No  Does patient currently own DME?: Yes  What DME does the patient currently own?: [employer]  Does patient have a history of Outpatient Therapy (PT/OT)?: No  Does the patient have a history of Short-Term Rehab?: No  Does patient have a history of HHC?: No  Does patient currently have [facility]?: No         Patient Information Continued  Income Source: SSI/SSD  Does patient have prescription coverage?: Yes  Within the past 12 months, you worried that your food would run out before you got the money to buy more : Never true  Within the past 12 months, the food you bought just didnt last and you didnt have money to get more : Never true  Food insecurity resource given?: N/A  Does patient receive dialysis treatments?: No  Does patient have a history of substance abuse?: No  Does patient have a history of Mental Health Diagnosis?: No         Means of Transportation  Means of Transport to Appts[de-identified] Drives Self  In the past 12 months, has lack of transportation kept you from medical appointments or from getting medications?: No  In the past 12 months, has lack of transportation kept you from meetings, work, or from getting things needed for daily living?: No  Was application for public transport provided?: N/A        DISCHARGE DETAILS:    Discharge planning discussed with[de-identified] patients wife  Freedom of Choice: Yes  Comments - Freedom of Choice: patients wife states he would like to discuss closer to discharge  CM contacted family/caregiver?: Yes     IMM Given (Date):: 22 (reviewed with pts wife Rudene  is agreeable to plan  imm placed in scan bin)  IMM Given to[de-identified] Family  Family notified[de-identified] loni       Cm spoke with pts wife, verified demographics and performed assessment  Wife states pt would like to discuss discharge plans closer to discharge  CM will follow up with pt today

## 2022-03-04 NOTE — PROGRESS NOTES
[facility] [address]  Progress Note - Mickey Vicente 1940, 79 y o  male MRN: [id number]  Unit/Bed#: ICU 01 Encounter: [id number]  Primary Care Provider: Freda Salcido DO   Date and time admitted to hospital: 3/3/2022  2:25 PM    * Symptomatic bradycardia  Assessment & Plan  · Bradycardic 30-40s on presentation to ER with hypotension (SBP 70-80), vision changes, lightheadedness and dizziness   · Takes Digoxin 125 mcg M-F and Coreg 12 5 mg BID at home but took his old Diltiazem 120 mg XL today instead of coreg (Was previously on diltiazem but changed to coreg in Jan in light of LE edema)  · Presenting EKG AF with slow ventricular response   · Found to also have hyperkalemia and PLACIDO, has been having loose stools, poor PO intake and taking diuretic   · Consistent with BRASH syndrome  · Digoxin level 1 1  · 3/3 Echo - EF 55%, mild concentric hypertrophy, severe TR with severe RA dilation, annular dilation of RV and mildly reduced RV function  · Received total of 2L IVF on admission  · HR improved now consistent in 60s, remains in Afib   BP stabilized after IVF and continually nico as HR nico  · Continue telemetry monitoring  · Continue holding home medications  · Cardiology consult     Hypotension  Assessment & Plan  · Secondary to bradycardia HR 30-40s and dehydration with 3 weeks loose stools and poor PO intake   · BP responsive to fluids in ER   · Will check blood cultures and was started on PO vanco with significantly elevated WBC and loose stools   · Likely large component secondary to bradycardia - likely from home medications  · Hypotension now resolved with improvement in HR      Atrial fibrillation (HCC)  Assessment & Plan  · Hx of AF on coumdain, digoxin 125 mg M-F and Coreg 12 5 BID (previously controlled on Diltiazem but switched to coreg for LE edema)   · Holding all rate control medications secondary to bradycardia   · Plan as above  · Coumadin on hold for supratherapeutic INR of 3 4, repeat INR pending this AM    Severe tricuspid valve regurgitation  Assessment & Plan  · Echo 3/3 EF 55%, mild concentric hypertrophy, severe TR with severe RA dilation, annular dilation of RV and mildly reduced RV function   · Hold on further IVFs      PLACIDO (acute kidney injury) ([facility] [address] )  Assessment & Plan  · Cr 1 49 with K 6 on admission  · Baseline Cr appears to be 1 1  · Pre-renal in setting of dehydration, bradycardia and hypotension along with diuretic use (on lasix 20mg BID Tuesday and Friday @ home)   · Received 2L IVF yesterday, hold on further IVF and encourage PO intake   · Strict I/O   · Holding home lasix  · Avoid nephrotoxic agents  · Trend serum BUN/creatinine    Hyperkalemia  Assessment & Plan  · K 6 0 on admission with PLACIDO, dehydration   · In setting of BRASH syndrome with AV gavin blockers as well   · Received calcium gluconate, 10 U IV insulin and D10 in ER as well as IVF   · Repeat BMP last night with K down to 5 2, BMP pending for this AM    Frequent loose stools  Assessment & Plan  · Reports loose stools 3-4 x day over past few 3 weeks, slightly low appetite and low fluid intake secondary to BPH   · Denies any bloody stools or melena, abdominal pain, N/V   · WBC 89 from baseline with CLL in 30s   · Was started on PO Vanco empirically secondary to concern for Cdiff, however no BM since admission, will DC     Bilateral lower extremity edema  Assessment & Plan  · Chronic mild LE edema   · Was recently switched off Diltiazem in light of this edema  · Pt reports appears edema is at baseline   · Takes lasix 20 mg Tues/Friday - holding secondary to PLACIDO and intravascular volume depletion     Anticoagulated on Coumadin  Assessment & Plan  · AC with Coumadin 5 mg daily for A fib   · On hold in setting of supratherapeutic INR 3 40 on admission  · Repeat INR this AM pending      Type 2 diabetes mellitus [facility])  Assessment & Plan  Lab Results   Component Value Date    HGBA1C 5 8 (H) 12/09/2021       Recent Labs 22  1610 22  2225   POCGLU 143* 99       Blood Sugar Average: Last 72 hrs:  (P) 121     · Hold home metformin   · Continue SSI coverage     Hyperlipidemia  Assessment & Plan  · Continue home statin     CLL (chronic lymphocytic leukemia) (HCC)  Assessment & Plan  · CLL on no tx, just under surveillance by physician   · Baseline WBC appears to be in 30s   · WBC currently elevated 89 17      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 and lower urinary tract symptoms  Assessment & Plan  · Hx BPH on Finasteride and Flomax   · Continue as BP tolerates  · Condom cath    Hard of hearing  Assessment & Plan  · Supportive care     ----------------------------------------------------------------------------------------  HPI/24hr events: HR improved to 60s overnight, remains in Afib,  BP stable 120s-130s  Patient appropriate for transfer out of the ICU today?: Patient does not meet criteria for ICU Follow-up Clinic; referral has not been made  Disposition: Transfer to Med Surg with Telemetry   Code Status: Level 1 - Full Code  ---------------------------------------------------------------------------------------  SUBJECTIVE    Review of Systems   All other systems reviewed and are negative      Review of systems was reviewed and negative unless stated above in HPI/24-hour events   ---------------------------------------------------------------------------------------  OBJECTIVE    Vitals   Vitals:    22 0200 22 0300 22 0400 22 0536   BP: 123/61 125/66 130/75    Pulse: 68 81 73    Resp: 22      Temp:       TempSrc:       SpO2: 93% 93% 94%    Weight:    78 2 kg (172 lb 6 4 oz)   Height:         Temp (24hrs), Av 4 °F (36 9 °C), Min:97 6 °F (36 4 °C), Max:99 °F (37 2 °C)  Current: Temperature: 98 5 °F (36 9 °C)          Respiratory:  SpO2: SpO2: 94 %, SpO2 Activity: SpO2 Activity: At Rest, SpO2 Device: O2 Device: None (Room air)       Invasive/non-invasive ventilation settings   Respiratory  Report   Lab Data (Last 4 hours)    None         O2/Vent Data (Last 4 hours)    None                Physical Exam  Constitutional:       General: He is awake  Appearance: Normal appearance  He is well-developed  HENT:      Head: Normocephalic and atraumatic  Eyes:      General: Lids are normal       Extraocular Movements: Extraocular movements intact  Conjunctiva/sclera: Conjunctivae normal    Neck:      Trachea: Trachea normal    Cardiovascular:      Rate and Rhythm: Normal rate and regular rhythm  Pulses:           Radial pulses are 2+ on the right side and 2+ on the left side  Dorsalis pedis pulses are 2+ on the right side and 2+ on the left side  Heart sounds: Normal heart sounds, S1 normal and S2 normal    Pulmonary:      Effort: Pulmonary effort is normal       Breath sounds: Normal breath sounds  Abdominal:      General: Bowel sounds are normal       Palpations: Abdomen is soft  Musculoskeletal:      Cervical back: Full passive range of motion without pain, normal range of motion and neck supple  Right lower le+ Edema present  Left lower le+ Edema present  Comments: Normal ROM    Skin:     General: Skin is warm and dry  Capillary Refill: Capillary refill takes less than 2 seconds  Neurological:      General: No focal deficit present  Mental Status: He is alert and oriented to person, place, and time  GCS: GCS eye subscore is 4  GCS verbal subscore is 5  GCS motor subscore is 6  Psychiatric:         Attention and Perception: Attention and perception normal          Mood and Affect: Mood and affect normal          Speech: Speech normal          Behavior: Behavior normal  Behavior is cooperative  Thought Content:  Thought content normal          Cognition and Memory: Cognition and memory normal          Judgment: Judgment normal              Laboratory and Diagnostics:  Results from last 7 days Lab Units 03/04/22  0436 03/03/22  1436   WBC Thousand/uL 90 04* 89 17*   HEMOGLOBIN g/dL 9 2* 9 8*   HEMATOCRIT % 30 6* 33 1*   PLATELETS Thousands/uL 63* 74*   MONO PCT %  --  3*     Results from last 7 days   Lab Units 03/04/22  0436 03/03/22  1740 03/03/22  1436   SODIUM mmol/L 139 142 141   POTASSIUM mmol/L 5 0 5 2 6 0*   CHLORIDE mmol/L 108 109* 107   CO2 mmol/L 22 23 22   ANION GAP mmol/L 9 10 12   BUN mg/dL 35* 37* 36*   CREATININE mg/dL 1 29 1 50* 1 49*   CALCIUM mg/dL 8 2* 8 0* 8 2*   GLUCOSE RANDOM mg/dL 93 85 134   ALT U/L 22  --  22   AST U/L 50*  --  48*   ALK PHOS U/L 103  --  107   ALBUMIN g/dL 3 0*  --  3 2*   TOTAL BILIRUBIN mg/dL 1 24*  --  1 22*     Results from last 7 days   Lab Units 03/04/22  0436 03/03/22  1436   MAGNESIUM mg/dL 2 1 2 3   PHOSPHORUS mg/dL 4 3* 5 3*      Results from last 7 days   Lab Units 03/04/22  0436 03/03/22  1438   INR  3 32* 3 40*   PTT seconds  --  38*              ABG:    VBG:    Results from last 7 days   Lab Units 03/04/22  0436   PROCALCITONIN ng/ml 0 13           Intake and Output  I/O       03/02 0701  03/03 0700 03/03 0701  03/04 0700    I V  (mL/kg)  1000 (13 4)    IV Piggyback  150    Total Intake(mL/kg)  1150 (15 5)    Urine (mL/kg/hr)  350    Total Output  350    Net  +800                Height and Weights   Height: 5' 8" (172 7 cm)  IBW (Ideal Body Weight): 68 4 kg  Body mass index is 26 21 kg/m²    Weight (last 2 days)     Date/Time Weight    03/04/22 0536 78 2 (172 4)    03/03/22 1545 74 4 (164)    03/03/22 1431 74 6 (164 46)            Nutrition       Diet Orders   (From admission, onward)             Start     Ordered    03/03/22 1942  Room Service  Once        Question:  Type of Service  Answer:  Room Service - Appropriate with Assistance    03/03/22 1942 03/03/22 5174  Diet Cardiovascular; Cardiac; Consistent Carbohydrate Diet Level 2 (5 carb servings/75 grams CHO/meal)  Diet effective now        References:    Nutrtion Support Algorithm Enteral vs  Parenteral   Question Answer Comment   Diet Type Cardiovascular    Cardiac Cardiac    Other Restriction(s): Consistent Carbohydrate Diet Level 2 (5 carb servings/75 grams CHO/meal)    RD to adjust diet per protocol? No        03/03/22 1716                  Active Medications  Scheduled Meds:  Current Facility-Administered Medications   Medication Dose Route Frequency Provider Last Rate    atorvastatin  40 mg Oral Daily Norlin Cava, PA-C      finasteride  5 mg Oral Daily [place], [place]      insulin lispro  1-5 Units Subcutaneous TID AC Norlin [place], [place]      insulin lispro  1-5 Units Subcutaneous HS [place], [place]      tamsulosin  0 4 mg Oral HS Norlin Cava, PA-C       Continuous Infusions:     PRN Meds:        Invasive Devices Review  Invasive Devices  Report    Peripheral Intravenous Line            Peripheral IV 03/03/22 Left Forearm <1 day    Peripheral IV 03/03/22 Right Antecubital <1 day                Rationale for remaining devices: N/A    Care Time Delivered:   No Critical Care time spent       [employer], CRNP      Portions of the record may have been created with voice recognition software  Occasional wrong word or "sound a like" substitutions may have occurred due to the inherent limitations of voice recognition software    Read the chart carefully and recognize, using context, where substitutions have occurred

## 2022-03-05 PROBLEM — E87.5 HYPERKALEMIA: Status: RESOLVED | Noted: 2022-03-03 | Resolved: 2022-03-05

## 2022-03-05 PROBLEM — I95.9 HYPOTENSION: Status: RESOLVED | Noted: 2022-03-03 | Resolved: 2022-03-05

## 2022-03-05 PROBLEM — R19.7 FREQUENT LOOSE STOOLS: Status: RESOLVED | Noted: 2022-03-03 | Resolved: 2022-03-05

## 2022-03-05 LAB
ANION GAP SERPL CALCULATED.3IONS-SCNC: 8 MMOL/L (ref 4–13)
ANISOCYTOSIS BLD QL SMEAR: PRESENT
BASOPHILS # BLD MANUAL: 0 THOUSAND/UL (ref 0–0.1)
BASOPHILS NFR MAR MANUAL: 0 % (ref 0–1)
BUN SERPL-MCNC: 27 MG/DL (ref 5–25)
CALCIUM SERPL-MCNC: 8.1 MG/DL (ref 8.3–10.1)
CHLORIDE SERPL-SCNC: 109 MMOL/L (ref 100–108)
CO2 SERPL-SCNC: 24 MMOL/L (ref 21–32)
CREAT SERPL-MCNC: 0.96 MG/DL (ref 0.6–1.3)
EOSINOPHIL # BLD MANUAL: 0.78 THOUSAND/UL (ref 0–0.4)
EOSINOPHIL NFR BLD MANUAL: 1 % (ref 0–6)
ERYTHROCYTE [DISTWIDTH] IN BLOOD BY AUTOMATED COUNT: 17.2 % (ref 11.6–15.1)
GFR SERPL CREATININE-BSD FRML MDRD: 73 ML/MIN/1.73SQ M
GLUCOSE SERPL-MCNC: 110 MG/DL (ref 65–140)
GLUCOSE SERPL-MCNC: 111 MG/DL (ref 65–140)
GLUCOSE SERPL-MCNC: 121 MG/DL (ref 65–140)
GLUCOSE SERPL-MCNC: 77 MG/DL (ref 65–140)
GLUCOSE SERPL-MCNC: 82 MG/DL (ref 65–140)
HCT VFR BLD AUTO: 31.3 % (ref 36.5–49.3)
HGB BLD-MCNC: 9.6 G/DL (ref 12–17)
INR PPP: 2.43 (ref 0.84–1.19)
LYMPHOCYTES # BLD AUTO: 69.84 THOUSAND/UL (ref 0.6–4.47)
LYMPHOCYTES # BLD AUTO: 89 % (ref 14–44)
MAGNESIUM SERPL-MCNC: 2.1 MG/DL (ref 1.6–2.6)
MCH RBC QN AUTO: 31.2 PG (ref 26.8–34.3)
MCHC RBC AUTO-ENTMCNC: 30.7 G/DL (ref 31.4–37.4)
MCV RBC AUTO: 102 FL (ref 82–98)
METAMYELOCYTES NFR BLD MANUAL: 1 % (ref 0–1)
MICROCYTES BLD QL AUTO: PRESENT
MONOCYTES # BLD AUTO: 3.14 THOUSAND/UL (ref 0–1.22)
MONOCYTES NFR BLD: 4 % (ref 4–12)
MRSA NOSE QL CULT: NORMAL
NEUTROPHILS # BLD MANUAL: 0.78 THOUSAND/UL (ref 1.85–7.62)
NEUTS SEG NFR BLD AUTO: 1 % (ref 43–75)
PLATELET # BLD AUTO: 72 THOUSANDS/UL (ref 149–390)
PLATELET BLD QL SMEAR: ABNORMAL
PMV BLD AUTO: 9.5 FL (ref 8.9–12.7)
POLYCHROMASIA BLD QL SMEAR: PRESENT
POTASSIUM SERPL-SCNC: 4.5 MMOL/L (ref 3.5–5.3)
PROCALCITONIN SERPL-MCNC: 0.07 NG/ML
PROTHROMBIN TIME: 25.6 SECONDS (ref 11.6–14.5)
RBC # BLD AUTO: 3.08 MILLION/UL (ref 3.88–5.62)
RBC MORPH BLD: PRESENT
SMUDGE CELLS BLD QL SMEAR: PRESENT
SODIUM SERPL-SCNC: 141 MMOL/L (ref 136–145)
VARIANT LYMPHS # BLD AUTO: 4 %
WBC # BLD AUTO: 78.47 THOUSAND/UL (ref 4.31–10.16)

## 2022-03-05 PROCEDURE — 84145 PROCALCITONIN (PCT): CPT | Performed by: PHYSICIAN ASSISTANT

## 2022-03-05 PROCEDURE — 85610 PROTHROMBIN TIME: CPT | Performed by: NURSE PRACTITIONER

## 2022-03-05 PROCEDURE — 82948 REAGENT STRIP/BLOOD GLUCOSE: CPT

## 2022-03-05 PROCEDURE — 83735 ASSAY OF MAGNESIUM: CPT | Performed by: PHYSICIAN ASSISTANT

## 2022-03-05 PROCEDURE — 99232 SBSQ HOSP IP/OBS MODERATE 35: CPT | Performed by: INTERNAL MEDICINE

## 2022-03-05 PROCEDURE — 97530 THERAPEUTIC ACTIVITIES: CPT

## 2022-03-05 PROCEDURE — 85027 COMPLETE CBC AUTOMATED: CPT | Performed by: PHYSICIAN ASSISTANT

## 2022-03-05 PROCEDURE — 85007 BL SMEAR W/DIFF WBC COUNT: CPT | Performed by: PHYSICIAN ASSISTANT

## 2022-03-05 PROCEDURE — 80048 BASIC METABOLIC PNL TOTAL CA: CPT | Performed by: PHYSICIAN ASSISTANT

## 2022-03-05 RX ORDER — WARFARIN SODIUM 5 MG/1
5 TABLET ORAL
Status: DISCONTINUED | OUTPATIENT
Start: 2022-03-05 | End: 2022-03-06 | Stop reason: HOSPADM

## 2022-03-05 RX ADMIN — Medication 12.5 MG: at 09:18

## 2022-03-05 RX ADMIN — WARFARIN SODIUM 5 MG: 5 TABLET ORAL at 17:34

## 2022-03-05 RX ADMIN — Medication 12.5 MG: at 21:11

## 2022-03-05 RX ADMIN — TAMSULOSIN HYDROCHLORIDE 0.4 MG: 0.4 CAPSULE ORAL at 21:12

## 2022-03-05 RX ADMIN — ATORVASTATIN CALCIUM 40 MG: 40 TABLET, FILM COATED ORAL at 09:18

## 2022-03-05 RX ADMIN — FINASTERIDE 5 MG: 5 TABLET, FILM COATED ORAL at 09:18

## 2022-03-05 NOTE — ASSESSMENT & PLAN NOTE
Patient was hypotensive and bradycardic upon admission and noted to be in acute kidney injury  Patient received IV fluids with improved blood pressure

## 2022-03-05 NOTE — ASSESSMENT & PLAN NOTE
Potassium was 6 upon admission  Likely secondary dehydration and acute kidney injury  Received calcium gluconate insulin with D10 and IV fluids  Potassium level have been normal

## 2022-03-05 NOTE — PROGRESS NOTES
Manjeet 44  Progress Note - Leida Smith 1940, 79 y o  male MRN: [id number]  Unit/Bed#: [address] [place] [address] Encounter: [id number]  Primary Care Provider: Anthony Mckinney DO   Date and time admitted to hospital: 3/3/2022  2:25 PM    * Atrial fibrillation [facility])  Assessment & Plan  History of permanent atrial fibrillation  Patient is admitted hospital with atrial fibrillation with slow ventricular rate-likely etiology secondary to Cardizem use in the setting of acute kidney injury and dehydration  Heart rate has improved and patient was started back on metoprolol 12 5 milligram p o  B i d   Continue to hold digoxin  Patient is on anticoagulation with Coumadin-INR was 2 4  Coumadin have been on hold due to elevated INR which can be restarted  Severe tricuspid valve regurgitation  Assessment & Plan  Patient has known history of severe tricuspid regurgitation with markedly dilated right ventricle    Patient has normal LV systolic function  No clinical evidence of fluid overload    PLACIDO (acute kidney injury) ([facility] )  Assessment & Plan  Presented with a creatinine of 1 5 and potassium of 6 upon admission  Resolved with IV hydration  Lasix have been on hold  Avoid nephrotoxic agents and hypotension    Type 2 diabetes mellitus [facility])  Assessment & Plan  Lab Results   Component Value Date    HGBA1C 5 8 (H) 12/09/2021       Recent Labs     03/04/22  2128 03/05/22  0730 03/05/22  1114 03/05/22  1605   POCGLU 89 82 121 111       Blood Sugar Average: Last 72 hrs:  (P) 083 [id number]   Blood sugars are stable  Continue Humalog sliding scale  Patient is on metformin at home    Hyperlipidemia  Assessment & Plan  Continue Lipitor    CLL (chronic lymphocytic leukemia) (HCC)  Assessment & Plan  Being monitored  White count is significantly elevated currently with lymphocyte-predominant    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 and lower urinary tract symptoms  Assessment & Plan  Patient is on Proscar and Flomax which will be continued  Patient has been urinating well  Frequent loose stools-resolved as of 3/5/2022  Assessment & Plan  Patient presented with 3-4 loose stools for 3 weeks  Patient had no further diarrhea after admission  Patient was initially on p o  Vancomycin for possible C diff which was later discontinued    Hyperkalemia-resolved as of 3/5/2022  Assessment & Plan  Potassium was 6 upon admission  Likely secondary dehydration and acute kidney injury  Received calcium gluconate insulin with D10 and IV fluids  Potassium level have been normal    Hypotension-resolved as of 3/5/2022  Assessment & Plan  Patient was hypotensive and bradycardic upon admission and noted to be in acute kidney injury  Patient received IV fluids with improved blood pressure          VTE Pharmacologic Prophylaxis: VTE Score: 4 High Risk (Score >/= 5) - Pharmacological DVT Prophylaxis Ordered: warfarin (Coumadin)  Sequential Compression Devices Ordered  Patient Centered Rounds: I performed bedside rounds with nursing staff today  Discussions with Specialists or Other Care Team Provider: Dr Zoe Naik and Discussions with Family / Patient: Updated  (wife and daughter) at bedside  Time Spent for Care: 45 minutes  More than 50% of total time spent on counseling and coordination of care as described above  Current Length of Stay: 2 day(s)  Current Patient Status: Inpatient   Certification Statement: The patient will continue to require additional inpatient hospital stay due to Atrial fibrillation  Discharge Plan: Anticipate discharge tomorrow to home  Code Status: Level 1 - Full Code    Subjective:   Patient is feeling well  Denies any headache, dizziness, chest pain, shortness of breath, abdominal pain      Objective:     Vitals:   Temp (24hrs), Av 8 °F (36 6 °C), Min:97 5 °F (36 4 °C), Max:98 3 °F (36 8 °C)    Temp:  [97 5 °F (36 4 °C)-98 3 °F (36 8 °C)] 97 5 °F (36 4 °C)  HR:  [68-79] 68  Resp:  [16-21] 19  BP: (120-141)/(62-81) 122/62  SpO2:  [92 %-97 %] 97 %  Body mass index is 26 18 kg/m²  Input and Output Summary (last 24 hours): Intake/Output Summary (Last 24 hours) at 3/5/2022 1715  Last data filed at 3/5/2022 0745  Gross per 24 hour   Intake --   Output 2000 ml   Net -2000 ml       Physical Exam:   Physical Exam  Constitutional:       Appearance: Normal appearance  HENT:      Head: Normocephalic and atraumatic  Eyes:      Extraocular Movements: Extraocular movements intact  Pupils: Pupils are equal, round, and reactive to light  Cardiovascular:      Rate and Rhythm: Normal rate  Rhythm irregular  Heart sounds: Murmur heard  No gallop  Pulmonary:      Effort: Pulmonary effort is normal       Breath sounds: Normal breath sounds  Abdominal:      General: Bowel sounds are normal       Palpations: Abdomen is soft  Tenderness: There is no abdominal tenderness  Musculoskeletal:         General: No swelling or deformity  Normal range of motion  Cervical back: Normal range of motion and neck supple  Skin:     General: Skin is warm and dry  Neurological:      General: No focal deficit present  Mental Status: He is alert           Additional Data:     Labs:  Results from last 7 days   Lab Units 03/05/22  0505   WBC Thousand/uL 78 47*   HEMOGLOBIN g/dL 9 6*   HEMATOCRIT % 31 3*   PLATELETS Thousands/uL 72*   LYMPHO PCT % 89*   MONO PCT % 4   EOS PCT % 1     Results from last 7 days   Lab Units 03/05/22  0505 03/04/22  0436 03/04/22  0436   SODIUM mmol/L 141   < > 139   POTASSIUM mmol/L 4 5   < > 5 0   CHLORIDE mmol/L 109*   < > 108   CO2 mmol/L 24   < > 22   BUN mg/dL 27*   < > 35*   CREATININE mg/dL 0 96   < > 1 29   ANION GAP mmol/L 8   < > 9   CALCIUM mg/dL 8 1*   < > 8 2*   ALBUMIN g/dL  --   --  3 0*   TOTAL BILIRUBIN mg/dL  --   --  1 24*   ALK PHOS U/L  --   --  103   ALT U/L  --   --  22   AST U/L  --   --  50* GLUCOSE RANDOM mg/dL 77   < > 93    < > = values in this interval not displayed  Results from last 7 days   Lab Units 03/05/22  1130   INR  2 43*     Results from last 7 days   Lab Units 03/05/22  1605 03/05/22  1114 03/05/22  0730 03/04/22  2128 03/04/22  1635 03/04/22  1107 03/04/22  0608 03/03/22  2225 03/03/22  1610   POC GLUCOSE mg/dl 111 121 82 89 103 123 100 99 143*         Results from last 7 days   Lab Units 03/05/22  0505 03/04/22  0436   PROCALCITONIN ng/ml 0 07 0 13       Lines/Drains:  Invasive Devices  Report    Peripheral Intravenous Line            Peripheral IV 03/03/22 Right Antecubital 2 days    Peripheral IV 03/03/22 Left Forearm 1 day                  Telemetry:  Telemetry Orders (From admission, onward)             48 Hour Telemetry Monitoring  Continuous x 48 hours        References:    Telemetry Guidelines   Question:  Reason for 48 Hour Telemetry  Answer:  Arrhythmias Requiring Medical Therapy (eg  SVT, Vtach/fib, Bradycardia, Uncontrolled A-fib)                 Telemetry Reviewed: Atrial fibrillation  HR averaging 60-70  Indication for Continued Telemetry Use: Arrthymias requiring medical therapy             Imaging: Reviewed radiology reports from this admission including: chest xray    Recent Cultures (last 7 days):   Results from last 7 days   Lab Units 03/03/22  1811   BLOOD CULTURE  No Growth at 24 hrs  No Growth at 24 hrs         Last 24 Hours Medication List:   Current Facility-Administered Medications   Medication Dose Route Frequency Provider Last Rate    atorvastatin  40 mg Oral Daily Kathie Burden [place]      finasteride  5 mg Oral Daily [place], [place]      insulin lispro  1-5 Units Subcutaneous HS [place], [place]      insulin lispro  1-5 Units Subcutaneous TID AC Kathie Burden [place]      metoprolol tartrate  12 5 mg Oral Q12H [address] [place]ie [place], [place]      tamsulosin  0 4 mg Oral HS Kathie Burden PA-C          Today, Patient Was Seen By: Jaimee Gaona MD Sanju    **Please Note: This note may have been constructed using a voice recognition system  **

## 2022-03-05 NOTE — ASSESSMENT & PLAN NOTE
Patient has known history of severe tricuspid regurgitation with markedly dilated right ventricle    Patient has normal LV systolic function  No clinical evidence of fluid overload

## 2022-03-05 NOTE — ASSESSMENT & PLAN NOTE
Patient presented with 3-4 loose stools for 3 weeks  Patient had no further diarrhea after admission  Patient was initially on p o   Vancomycin for possible C diff which was later discontinued Physical Therapy   Initial Evaluation     Patient Name: Goran Chavez  Age:  64 y.o., Sex:  male  Medical Record #: [id number]  Today's Date: 4/14/2021     Precautions: (P) Fall Risk    Assessment  Patient is 64 y.o. male admitted for worsening purulence/drainage, pain and swelling of his legs. 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 Patient lives in mobile home with daughter and grandkids with 4 KENNEDI and BHR. Patient's daughter assists with IADLs and patient is able to perform ADLS IND. At time of evaluation, patient presenting near baseline function. Patient with use of FWW for ambulation in hallway with no overt LOB. Patient educated on importance of pressure relief shoe and educated on juan francisco/doff. RN notified regarding order of 2nd post op shoe for LLE. Patient has no further IP PT needs at this time.    Plan    Recommend Physical Therapy for Evaluation only     DC Equipment Recommendations: (P) None(owns FWW, SPC, SC)  Discharge Recommendations: (P) Anticipate that the patient will have no further physical therapy needs after discharge from the hospital        Objective       04/14/21 1400   Prior Living Situation   Prior Services Skilled Home Health Services   Housing / Facility Mobile Home   Steps Into Home 4   Steps In Home 0   Rail Both Rail (Steps into Home)   Bathroom Set up Bathtub / Shower Combination;Shower Chair   Equipment Owned Front-Wheel Walker;Single Point Cane;Tub / Shower Seat   Lives with - Patient's Self Care Capacity Child Less than 18 Years of Age;Adult Children   Comments pt lives with daughter and grandkids, daughter assists with IADLs   Prior Level of Functional Mobility   Bed Mobility Independent   Transfer Status Independent   Ambulation Independent   Distance Ambulation (Feet)   (community distances)   Assistive Devices Used Front-Wheel Walker   Stairs Independent    Gait Analysis   Gait Level Of Assist Supervised   Assistive Device Front Wheel Walker   Distance (Feet) 200   # of Times Distance was Traveled 1   Deviation Bradykinetic   # of Stairs Climbed 0   Weight Bearing Status no restrictions   Bed Mobility    Supine to Sit Supervised   Sit to Supine Supervised   Scooting Supervised   Rolling Supervised   Functional Mobility   Sit to Stand Supervised   Bed, Chair, Wheelchair Transfer Supervised   Toilet Transfers Supervised   Transfer Method Stand Step   Mobility w/ FWW   Anticipated Discharge Equipment and Recommendations   DC Equipment Recommendations None  (owns FWW, SPC, SC)   Discharge Recommendations Anticipate that the patient will have no further physical therapy needs after discharge from the hospital

## 2022-03-05 NOTE — PROGRESS NOTES
Progress Note - Cardiology   [facility]     Mindy Valdez 79 y o  male MRN: [id number]  : 1940  Unit/Bed#: [address] [place] [address] Encounter: [id number]    Assessment and Plan:   1  Permanent atrial fibrillation:  Patient was admitted with lightheaded and dizziness secondary to volume depletion due to not drinking fluids       -  He also had an acute kidney injury which was most likely affecting the metabolism of his medications  Heart rates improved with hydration and adjustment of medications       -  continue Lopressor 12 5 mg b i d     -  discontinued digoxin 0 125 mg    -  patient on Coumadin for anti thrombotic management  INR yesterday was 3 32 dose was held  -  recheck INR today and resume Coumadin when INR between the 2-3 range  -  patient states previously had been on Eliquis but it was too expensive for him  2  Severe tricuspid valve regurgitation:  Severe tricuspid valve regurgitation seen on echocardiogram    3  Dyslipidemia:  Continue statin therapy    4  BPH:  Patient notes he does not drink fluids due to history of urgency and frequency  Encouraged him to stay well hydrated and follow-up with Urology if needed    5  Diabetes:  Managed per primary team    6  CLL    Subjective / Objective:   Patient seen and examined  He states that he is feeling much better  Heart rates have improved and have been in the 60s to 70s  He remains in atrial fibrillation  Patient on Coumadin, INR was elevated and dose was held  Will recheck INR today  Discussed with primary team     Vitals: Blood pressure 122/78, pulse 79, temperature 98 °F (36 7 °C), resp  rate 18, height 5' 8" (1 727 m), weight 78 1 kg (172 lb 2 9 oz), SpO2 92 %  Vitals:    22 0536 22 0600   Weight: 78 2 kg (172 lb 6 4 oz) 78 1 kg (172 lb 2 9 oz)     Body mass index is 26 18 kg/m²    BP Readings from Last 3 Encounters:   22 122/78   21 130/64   20 138/70     Orthostatic Blood Pressures      Most Recent Value   Blood Pressure 122/78 filed at 03/05/2022 0809   Patient Position - Orthostatic VS Sitting filed at 03/04/2022 7410        I/O       03/03 0701  03/04 0700 03/04 0701  03/05 0700 03/05 0701  03/06 0700    I V  (mL/kg) 1000 (12 8)      IV Piggyback 150      Total Intake(mL/kg) 1150 (14 7)      Urine (mL/kg/hr) 550 2400 (1 3) 400 (1 3)    Total Output 550 2400 400    Net +600 -2400 -400               Invasive Devices  Report    Peripheral Intravenous Line            Peripheral IV 03/03/22 Left Forearm 1 day    Peripheral IV 03/03/22 Right Antecubital 1 day                  Intake/Output Summary (Last 24 hours) at 3/5/2022 1053  Last data filed at 3/5/2022 0745  Gross per 24 hour   Intake --   Output 2300 ml   Net -2300 ml         Physical Exam:   Physical Exam  Vitals and nursing note reviewed  Constitutional:       General: He is not in acute distress  Appearance: Normal appearance  He is well-developed  He is obese  HENT:      Head: Normocephalic  Right Ear: External ear normal       Left Ear: External ear normal       Nose: Nose normal    Eyes:      General: No scleral icterus  Right eye: No discharge  Left eye: No discharge  Pupils: Pupils are equal, round, and reactive to light  Neck:      Thyroid: No thyromegaly  Cardiovascular:      Rate and Rhythm: Normal rate  Rhythm irregular  Pulses: Normal pulses  Heart sounds: Normal heart sounds  No murmur heard  Pulmonary:      Effort: Pulmonary effort is normal  No respiratory distress  Breath sounds: Normal breath sounds  No wheezing, rhonchi or rales  Abdominal:      General: Bowel sounds are normal  There is no distension  Palpations: Abdomen is soft  Musculoskeletal:      Cervical back: Normal range of motion and neck supple  Skin:     General: Skin is warm and dry  Capillary Refill: Capillary refill takes less than 2 seconds     Neurological:      General: No focal deficit present  Mental Status: He is alert and oriented to person, place, and time  Mental status is at baseline     Psychiatric:         Mood and Affect: Mood normal                    Medications/ Allergies:     Current Facility-Administered Medications   Medication Dose Route Frequency Provider Last Rate    atorvastatin  40 mg Oral Daily Sarina James PA-C      finasteride  5 mg Oral Daily Chris Ochoa [place], [place]      insulin lispro  1-5 Units Subcutaneous HS Chris Don [place], [place]      insulin lispro  1-5 Units Subcutaneous TID AC [place], [place]      metoprolol tartrate  12 5 mg Oral Q12H [address] Sarina [place], [place]      tamsulosin  0 4 mg Oral HS Sarina James PA-C          No Known Allergies    VTE Pharmacologic Prophylaxis:   Sequential compression device (Venodyne)     Labs:   Troponins:  Results from last 7 days   Lab Units 03/03/22  1740 03/03/22  1649   HSTNI D2 ng/L  --  -1   HSTNI D4 ng/L -1  --        CBC with diff:  Results from last 7 days   Lab Units 03/05/22  0505 03/04/22  0436 03/03/22  1436   WBC Thousand/uL 78 47* 90 04* 89 17*   HEMOGLOBIN g/dL 9 6* 9 2* 9 8*   HEMATOCRIT % 31 3* 30 6* 33 1*   MCV fL 102* 100* 101*   PLATELETS Thousands/uL 72* 63* 74*   MCH pg 31 2 30 2 30 0   MCHC g/dL 30 7* 30 1* 29 6*   RDW % 17 2* 17 1* 17 3*   MPV fL 9 5 9 9 10 0       CMP:  Results from last 7 days   Lab Units 03/05/22  0505 03/04/22  0436 03/03/22  1740 03/03/22  1436   SODIUM mmol/L 141 139 142 141   POTASSIUM mmol/L 4 5 5 0 5 2 6 0*   CHLORIDE mmol/L 109* 108 109* 107   CO2 mmol/L 24 22 23 22   ANION GAP mmol/L 8 9 10 12   BUN mg/dL 27* 35* 37* 36*   CREATININE mg/dL 0 96 1 29 1 50* 1 49*   CALCIUM mg/dL 8 1* 8 2* 8 0* 8 2*   AST U/L  --  50*  --  48*   ALT U/L  --  22  --  22   ALK PHOS U/L  --  103  --  107   TOTAL PROTEIN g/dL  --  5 8*  --  6 0*   ALBUMIN g/dL  --  3 0*  --  3 2*   TOTAL BILIRUBIN mg/dL  --  1 24*  --  1 22*   EGFR ml/min/1 73sq m 73 51 42 43 Magnesium:  Results from last 7 days   Lab Units 03/05/22  0505 03/04/22  0436 03/03/22  1436   MAGNESIUM mg/dL 2 1 2 1 2 3     Coags:  Results from last 7 days   Lab Units 03/04/22  0436 03/03/22  1438   PTT seconds  --  38*   INR  3 32* 3 40*     NT-proBNP:   Recent Labs     03/03/22  1436   NTBNP 4,527*        Imaging & Testing   I have personally reviewed pertinent reports  Echo complete w/ contrast if indicated    Result Date: 3/3/2022  Narrative: Lorin Paniagua  Left Ventricle: Left ventricular cavity size is normal  Wall thickness is mildly increased  There is mild concentric hypertrophy  The left ventricular ejection fraction is 55% by visual estimation  Systolic function is normal  Wall motion is normal    Right Ventricle: Right ventricular cavity size is moderately dilated  Annular dilatation of RV noted Systolic function is mildly reduced  Wall thickness is increased    Left Atrium: The atrium is moderately dilated    Right Atrium: The atrium is severely dilated    Aortic Valve: There is mild regurgitation    Mitral Valve: There is mild annular calcification  There is mild regurgitation    Tricuspid Valve: There is severe regurgitation  Hard to accurately calculated pulmonary artery pressure due to wide open TR but appears to be around 35-40 mmHg  EKG / Monitor: Personally reviewed  Atrial fibrillation with controlled ventricular response, no further episodes of bradycardia      CAROL Mas  Cardiology      "This note has been constructed using a voice recognition system  Therefore there may be syntax, spelling, and/or grammatical errors   Please call if you have any questions  "

## 2022-03-05 NOTE — PLAN OF CARE
Problem: PAIN - ADULT  Goal: Verbalizes/displays adequate comfort level or baseline comfort level  Description: Interventions:  - Encourage patient to monitor pain and request assistance  - Assess pain using appropriate pain scale  - Administer analgesics based on type and severity of pain and evaluate response  - Implement non-pharmacological measures as appropriate and evaluate response  - Consider cultural and social influences on pain and pain management  - Notify physician/advanced practitioner if interventions unsuccessful or patient reports new pain  Outcome: Progressing     Problem: INFECTION - ADULT  Goal: Absence or prevention of progression during hospitalization  Description: INTERVENTIONS:  - Assess and monitor for signs and symptoms of infection  - Monitor lab/diagnostic results  - Monitor all insertion sites, i e  indwelling lines, tubes, and drains  - Monitor endotracheal if appropriate and nasal secretions for changes in amount and color  - [place] appropriate cooling/warming therapies per order  - Administer medications as ordered  - Instruct and encourage patient and family to use good hand hygiene technique  - Identify and instruct in appropriate isolation precautions for identified infection/condition  Outcome: Progressing  Goal: Absence of fever/infection during neutropenic period  Description: INTERVENTIONS:  - Monitor WBC    Outcome: Progressing     Problem: Nutrition/Hydration-ADULT  Goal: Nutrient/Hydration intake appropriate for improving, restoring or maintaining nutritional needs  Description: Monitor and assess patient's nutrition/hydration status for malnutrition  Collaborate with interdisciplinary team and initiate plan and interventions as ordered  Monitor patient's weight and dietary intake as ordered or per policy  Utilize nutrition screening tool and intervene as necessary  Determine patient's food preferences and provide high-protein, high-caloric foods as appropriate  INTERVENTIONS:  - Monitor oral intake, urinary output, labs, and treatment plans  - Assess nutrition and hydration status and recommend course of action  - Evaluate amount of meals eaten  - Assist patient with eating if necessary   - Allow adequate time for meals  - Recommend/ encourage appropriate diets, oral nutritional supplements, and vitamin/mineral supplements  - Order, calculate, and assess calorie counts as needed  - Recommend, monitor, and adjust tube feedings and TPN/PPN based on assessed needs  - Assess need for intravenous fluids  - Provide specific nutrition/hydration education as appropriate  - Include patient/family/caregiver in decisions related to nutrition  Outcome: Progressing

## 2022-03-05 NOTE — PHYSICAL THERAPY NOTE
PT TREATMENT     03/05/22 1523   PT Last Visit   PT Visit Date 03/05/22   Note Type   Note Type Treatment   Pain Assessment   Pain Assessment Tool 0-10   Pain Score No Pain   Restrictions/Precautions   Weight Bearing Precautions Per Order No   Other Precautions Chair Alarm; Bed Alarm; Fall Risk   General   Chart Reviewed Yes   Family/Caregiver Present No  (family entered at end of session)   Cognition   Overall Cognitive Status WFL   Arousal/Participation Cooperative   Attention Within functional limits   Orientation Level Oriented X4   Following Commands Follows all commands and directions without difficulty   Comments [place]   Subjective   Subjective "What are you here for?"     Bed Mobility   Additional Comments Pt presents in chair   Transfers   Sit to Stand 5  Supervision   Additional items Verbal cues   Stand to Sit 5  Supervision   Additional items Verbal cues   Toilet transfer 5  Supervision   Additional items Standard toilet   Additional Comments supervision as pt stood at toilet to urinate and then to wash hands: supervison   Ambulation/Elevation   Gait pattern   (mildly unsteady at times; but no LOB)   Gait Assistance 5  Supervision   Additional items Assist x 1;Verbal cues   Assistive Device None   Distance 200 feet with changes in direction and 30 feet in room    Stair Management Assistance 5  Supervision   Additional items Verbal cues   Stair Management Technique Two rails; Alternating pattern   Number of Stairs 3   Ambulation/Elevation Additional Comments Pt states that he walks flexed over due to history of low back surgery   Activity Tolerance   Activity Tolerance Patient tolerated treatment well   Medical Staff Made Aware yes: LORELEI Young: observed pt in [place]son and on stairs   Exercises   Balance training  with gait in hallway to watch objects and on stairs to use railings   Assessment   Prognosis Good   Problem List Decreased strength;Decreased endurance; Impaired balance;Decreased mobility; Impaired hearing   Assessment Pt is close to baseline of function  Expect to progress with PT during hospital course to allow pt to return home with family support and home PT is needed at that time  The patient's AM-PAC Basic Mobility Inpatient Short Form Raw Score is 20  A Raw score of greater than 16 suggests the patient may benefit from discharge to home  Please also refer to the recommendation of the Physical Therapist for safe discharge planning  Goals   Patient Goals to go home   Plan   Treatment/Interventions ADL retraining;Functional transfer training;LE strengthening/ROM; Elevations; Therapeutic exercise; Endurance training;Patient/family training;Equipment eval/education; Bed mobility;Gait training;Spoke to nursing   Progress Progressing toward goals   Recommendation   PT Discharge Recommendation Home with home health rehabilitation   [address] Mobility Inpatient   Turning in Bed Without Bedrails 4   Lying on Back to Sitting on Edge of Flat Bed 4   Moving Bed to Chair 3   Standing Up From Chair 3   Walk in Room 3   Climb 3-5 Stairs 3   Basic Mobility Inpatient Raw Score 20   Basic Mobility Standardized Score 43 99   Highest Level Of Mobility   JH-HLM Goal 6: Walk 10 steps or more   JH-HLM Highest Level of Mobility 8: Walk 250 feet ot more   JH-HLM Goal Achieved Yes   End of Consult   Patient Position at End of Consult Bedside chair;Bed/Chair alarm activated; All needs within reach   Licensure   [address] Number  Halley Tyron Rolon PT  [id number]

## 2022-03-05 NOTE — ASSESSMENT & PLAN NOTE
History of permanent atrial fibrillation  Patient is admitted hospital with atrial fibrillation with slow ventricular rate-likely etiology secondary to Cardizem use in the setting of acute kidney injury and dehydration  Heart rate has improved and patient was started back on metoprolol 12 5 milligram p o  B i d   Continue to hold digoxin  Patient is on anticoagulation with Coumadin-INR was 3 01

## 2022-03-05 NOTE — ASSESSMENT & PLAN NOTE
Presented with a creatinine of 1 5 and potassium of 6 upon admission  Resolved with IV hydration  Lasix have been on hold  Avoid nephrotoxic agents and hypotension

## 2022-03-05 NOTE — ASSESSMENT & PLAN NOTE
Lab Results   Component Value Date    HGBA1C 5 8 (H) 12/09/2021       Recent Labs     03/04/22  2128 03/05/22  0730 03/05/22  1114 03/05/22  1605   POCGLU 89 82 121 111       Blood Sugar Average: Last 72 hrs:  (P) 170 [id number]   Blood sugars are stable  Continue Humalog sliding scale  Patient is on metformin at home

## 2022-03-06 VITALS
HEART RATE: 72 BPM | OXYGEN SATURATION: 95 % | HEIGHT: 68 IN | TEMPERATURE: 97.4 F | RESPIRATION RATE: 18 BRPM | SYSTOLIC BLOOD PRESSURE: 125 MMHG | BODY MASS INDEX: 26.06 KG/M2 | DIASTOLIC BLOOD PRESSURE: 63 MMHG | WEIGHT: 171.96 LBS

## 2022-03-06 LAB
ATRIAL RATE: 42 BPM
ATRIAL RATE: 66 BPM
GLUCOSE SERPL-MCNC: 75 MG/DL (ref 65–140)
GLUCOSE SERPL-MCNC: 95 MG/DL (ref 65–140)
PROCALCITONIN SERPL-MCNC: 0.06 NG/ML
QRS AXIS: -36 DEGREES
QRS AXIS: 35 DEGREES
QRSD INTERVAL: 138 MS
QRSD INTERVAL: 138 MS
QT INTERVAL: 480 MS
QT INTERVAL: 506 MS
QTC INTERVAL: 402 MS
QTC INTERVAL: 410 MS
T WAVE AXIS: 10 DEGREES
T WAVE AXIS: 244 DEGREES
VENTRICULAR RATE: 38 BPM
VENTRICULAR RATE: 44 BPM

## 2022-03-06 PROCEDURE — 99239 HOSP IP/OBS DSCHRG MGMT >30: CPT | Performed by: INTERNAL MEDICINE

## 2022-03-06 PROCEDURE — 99232 SBSQ HOSP IP/OBS MODERATE 35: CPT | Performed by: INTERNAL MEDICINE

## 2022-03-06 PROCEDURE — 93010 ELECTROCARDIOGRAM REPORT: CPT | Performed by: INTERNAL MEDICINE

## 2022-03-06 PROCEDURE — 84145 PROCALCITONIN (PCT): CPT | Performed by: PHYSICIAN ASSISTANT

## 2022-03-06 PROCEDURE — 82948 REAGENT STRIP/BLOOD GLUCOSE: CPT

## 2022-03-06 RX ORDER — FUROSEMIDE 20 MG/1
20 TABLET ORAL DAILY PRN
Refills: 0
Start: 2022-03-06

## 2022-03-06 RX ADMIN — Medication 12.5 MG: at 08:07

## 2022-03-06 RX ADMIN — FINASTERIDE 5 MG: 5 TABLET, FILM COATED ORAL at 08:07

## 2022-03-06 RX ADMIN — ATORVASTATIN CALCIUM 40 MG: 40 TABLET, FILM COATED ORAL at 08:07

## 2022-03-06 NOTE — CASE MANAGEMENT
Case Management Discharge Planning Note    Patient name Feli Nguyen  Location [address] [facility] 415/4 Tisha Cheng-* MRN [id number]  : 1940 Date 3/6/2022       Current Admission Date: 3/3/2022  Current Admission Diagnosis:Atrial fibrillation [facility])   Patient Active Problem List    Diagnosis Date Noted    Atrial fibrillation ([facility] ) 2022    Symptomatic bradycardia 2022    CLL (chronic lymphocytic leukemia) ([place] [address] ) 2022    Anticoagulated on Coumadin 2022    Hyperlipidemia 2022    Bilateral lower extremity edema 2022    Type 2 diabetes mellitus ([place] [address] ) 2022    PLACIDO (acute kidney injury) ([place] [address] ) 2022    Severe tricuspid valve regurgitation 2022    Hard of hearing 2022    Dribbling following urination 2021    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 and lower urinary tract symptoms 2021      LOS (days): 3  Geometric Mean LOS (GMLOS) (days): 3 10  Days to GMLOS:0 2     OBJECTIVE:  Risk of Unplanned Readmission Score: 19       Current admission status: Inpatient   Preferred Pharmacy:   [facility] [address] [facility] JORGENSEN [facility]  [place] [address]  Phone: [phone] Fax: [phone]    Primary Care Provider: Karan Johnson DO    Primary Insurance: MEDICARE  Secondary Insurance: BLUE CROSS    DISCHARGE DETAILS:    Discharge planning discussed with[de-identified] Patient and wife [facility] Choice: Yes     Comments - Freedom of Choice: SW discussed recommendation for [facility] with patient and his wife separately by phone  Wife Johann Gonzalez said that she would leave the decision up to him  SW called patient on his room phone and offered to make referral for [facility] but patient declined  Patient stated that he does not feel he needs home PT and would prefer to go to outpatient PT if anything       CM contacted family/caregiver?: Yes  Were Treatment Team discharge recommendations reviewed with patient/caregiver?: Yes  Did patient/caregiver verbalize understanding of patient care needs?: Yes  Were patient/caregiver advised of the risks associated with not following Treatment Team discharge recommendations?: Yes    Contacts  Patient Contacts: James Elliott (wife)  Relationship to Patient[de-identified] Family  Contact Method: Phone  Phone Number: [phone]  Reason/Outcome: Emergency [address]         Is the patient interested in [facility] at discharge?: No    DME Referral Provided  Referral made for DME?: No    Other Referral/Resources/Interventions Provided:  Interventions: None Indicated    Would you like to participate in our [address] service program?  : No - Declined    Treatment Team Recommendation: Home with [address] [facility]  Discharge Destination Plan[de-identified] Home

## 2022-03-06 NOTE — ASSESSMENT & PLAN NOTE
Presented with a creatinine of 1 5 and potassium of 6 upon admission  Resolved with IV hydration  Patient can not take Lasix p r n    Avoid nephrotoxic agents and hypotension

## 2022-03-06 NOTE — DISCHARGE SUMMARY
[address]  Discharge- Husam Burnette 1940, 79 y o  male MRN: [id number]  Unit/Bed#: [address] [place] [address] Encounter: [id number]  Primary Care Provider: Sammy Longoria DO   Date and time admitted to hospital: 3/3/2022  2:25 PM    * Atrial fibrillation [facility])  Assessment & Plan  History of permanent atrial fibrillation  Patient is admitted hospital with atrial fibrillation with slow ventricular rate-likely etiology secondary to Cardizem use in the setting of acute kidney injury and dehydration  Heart rate has improved and patient was started back on metoprolol 12 5 milligram p o  B i d   Continue metoprolol and anticoagulation with Coumadin  INR was 2 4 and patient was restarted back on Coumadin 5 milligram p o  Daily  2D echo showed EF of 55% with severe tricuspid regurgitation    PLACIDO (acute kidney injury) ([facility] )  Assessment & Plan  Presented with a creatinine of 1 5 and potassium of 6 upon admission  Resolved with IV hydration  Patient can not take Lasix p r n  Avoid nephrotoxic agents and hypotension    Type 2 diabetes mellitus [facility])  Assessment & Plan  Lab Results   Component Value Date    HGBA1C 5 8 (H) 12/09/2021       Recent Labs     03/05/22  1605 03/05/22  1956 03/06/22  0749 03/06/22  1135   POCGLU 111 110 75 95       Blood Sugar Average: Last 72 hrs:  (P) 104 25   Blood sugars are stable  Resume metformin upon discharge    Hyperlipidemia  Assessment & Plan  Continue Lipitor    CLL (chronic lymphocytic leukemia) ([facility] )  Assessment & Plan  Being monitored  White count is significantly elevated currently with lymphocyte-predominant    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 and lower urinary tract symptoms  Assessment & Plan  Patient is on Proscar and Flomax which will be continued  Patient has been urinating well        Medical Problems             Resolved Problems  Date Reviewed: 3/6/2022          Resolved    Hypotension 3/5/2022     Resolved by  Tim Dawkins MD Sanju    Hyperkalemia 3/5/2022     Resolved by  Balaji Romano MD    Frequent loose stools 3/5/2022     Resolved by  Balaji Romano MD              Discharging Physician / Practitioner: Balaji Romano MD  PCP: Antonette Ruby DO  Admission Date:   Admission Orders (From admission, onward)     Ordered        03/03/22 1554  Inpatient Admission  Once                      Discharge Date: 03/06/22    Consultations During Hospital Stay:  · Cardiology     Outpatient Tests Requested:  · PT/INR in 3 days  Outpatient follow-up with Cardiology in PCP    Complications:  None    Reason for Admission:  Vision changes with lightheadedness and loose stool    Hospital Course:   Tiffany Sanchez is a 79 y o  male patient with past medical history atrial fibrillation, CLL, chronic lower extremity edema, BPH who originally presented to the hospital on 3/3/2022 due to vision changes with dizziness  Patient took Cardizem on the morning of admission and also digoxin  Patient presented with blurry vision dizziness  In the ED patient's heart rate was very slow in the range of 30-40 and patient was noted to be in acute kidney injury  Patient was admitted to step-down unit for close observation  Patient was given IV hydration with improved creatinine  Telemetry showed no evidence of arrhythmias  Patient restarted back on low-dose metoprolol once heart rate was improved  Patient remained stable without any further symptoms  Patient also seen by physical therapy and patient was ambulating hallway  Patient to be discharged home with outpatient follow-up  Please see above list of diagnoses and related plan for additional information  Condition at Discharge: stable    Discharge Day Visit / Exam:   Subjective:  Patient is feeling well  Denies any vision changes, dizziness, chest pain, shortness of breath    Vitals: Blood Pressure: 125/63 (03/06/22 0751)  Pulse: 72 (03/06/22 0751)  Temperature: (!) 97 4 °F (36 3 °C) (03/06/22 0751)  Temp Source: Axillary (03/06/22 0751)  Respirations: 18 (03/06/22 0751)  Height: 5' 8" (172 7 cm) (03/03/22 1545)  Weight - Scale: 78 kg (171 lb 15 3 oz) (03/06/22 0600)  SpO2: 95 % (03/06/22 0751)  Exam:   Physical Exam  Constitutional:       Appearance: Normal appearance  HENT:      Head: Normocephalic and atraumatic  Eyes:      Extraocular Movements: Extraocular movements intact  Pupils: Pupils are equal, round, and reactive to light  Cardiovascular:      Rate and Rhythm: Normal rate  Rhythm irregular  Heart sounds: Murmur heard  No gallop  Pulmonary:      Effort: Pulmonary effort is normal       Breath sounds: Normal breath sounds  Abdominal:      General: Bowel sounds are normal       Palpations: Abdomen is soft  Tenderness: There is no abdominal tenderness  Musculoskeletal:         General: No swelling or deformity  Normal range of motion  Cervical back: Normal range of motion and neck supple  Skin:     General: Skin is warm and dry  Neurological:      General: No focal deficit present  Mental Status: He is alert  Discussion with Family:  Bedside discussion with wife and daughter on March 5, 2022    Discharge instructions/Information to patient and family:   See after visit summary for information provided to patient and family  Provisions for Follow-Up Care:  See after visit summary for information related to follow-up care and any pertinent home health orders  Disposition:   Home    Planned Readmission: No     Discharge Statement:  I spent 35  minutes discharging the patient  This time was spent on the day of discharge  I had direct contact with the patient on the day of discharge  Greater than 50% of the total time was spent examining patient, answering all patient questions, arranging and discussing plan of care with patient as well as directly providing post-discharge instructions    Additional time then spent on discharge activities  Discharge Medications:  See after visit summary for reconciled discharge medications provided to patient and/or family        **Please Note: This note may have been constructed using a voice recognition system**

## 2022-03-06 NOTE — ASSESSMENT & PLAN NOTE
Lab Results   Component Value Date    HGBA1C 5 8 (H) 12/09/2021       Recent Labs     03/05/22  1605 03/05/22  1956 03/06/22  0749 03/06/22  1135   POCGLU 111 110 75 95       Blood Sugar Average: Last 72 hrs:  (P) 104 25   Blood sugars are stable  Resume metformin upon discharge

## 2022-03-06 NOTE — PROGRESS NOTES
Progress Note - Cardiology   [facility]     Abundio Parker 79 y o  male MRN: [id number]  : 1940  Unit/Bed#: [address] [place] [address] Encounter: [id number]    Assessment and Plan:   1  Permanent atrial fibrillation:  Patient was admitted with lightheaded and dizziness secondary to volume depletion due to not drinking fluids       -  He also had an acute kidney injury which was most likely affecting the metabolism of his medications  Heart rates improved with hydration and adjustment of medications       -  continue Lopressor 12 5 mg b i d     -  discontinued digoxin 0 125 mg    -  patient on Coumadin for anti thrombotic management    -  a m  INR currently pending  Patient ordered for Coumadin 5 mg daily per primary team which was his dose at home  -  patient interested in home INR monitor in the outpatient setting    -  patient states previously had been on Eliquis but it was too expensive for him      2  Severe tricuspid valve regurgitation:  Severe tricuspid valve regurgitation seen on echocardiogram     3  Dyslipidemia:  Continue statin therapy     4  BPH:  Patient notes he does not drink fluids due to history of urgency and frequency  Encouraged him to stay well hydrated and follow-up with Urology if needed     5  Diabetes:  Managed per primary team     6  CLL    Subjective / Objective:   Patient seen and examined  He is in good spirits  He is considering going to subacute rehab as encourage by his family  Patient has been following in [facility] for primary care  Wishes to establish care closer to his home in [place]  Information placed on AVS     Vitals: Blood pressure 119/57, pulse 67, temperature (!) 94 6 °F (34 8 °C), resp  rate 14, height 5' 8" (1 727 m), weight 78 kg (171 lb 15 3 oz), SpO2 96 %  Vitals:    22 0600 22 0600   Weight: 78 1 kg (172 lb 2 9 oz) 78 kg (171 lb 15 3 oz)     Body mass index is 26 15 kg/m²    BP Readings from Last 3 Encounters:   22 119/57   07/26/21 130/64   07/23/20 138/70     Orthostatic Blood Pressures      Most Recent Value   Blood Pressure 119/57 filed at 03/06/2022 0300   Patient Position - Orthostatic VS Lying filed at 03/05/2022 2326        I/O       03/04 0701  03/05 0700 03/05 0701  03/06 0700 03/06 0701  03/07 0700    I V  (mL/kg)       IV Piggyback       Total Intake(mL/kg)       Urine (mL/kg/hr) 2400 (1 3) 400 (0 2)     Total Output 2400 400     Net -2400 -400                Invasive Devices  Report    Peripheral Intravenous Line            Peripheral IV 03/03/22 Left Forearm 2 days    Peripheral IV 03/03/22 Right Antecubital 2 days                  Intake/Output Summary (Last 24 hours) at 3/6/2022 9520  Last data filed at 3/5/2022 0745  Gross per 24 hour   Intake --   Output 400 ml   Net -400 ml         Physical Exam:   Physical Exam  Vitals and nursing note reviewed  Constitutional:       General: He is not in acute distress  Appearance: Normal appearance  He is well-developed and normal weight  HENT:      Head: Normocephalic  Right Ear: External ear normal       Left Ear: External ear normal       Nose: Nose normal    Eyes:      General: No scleral icterus  Right eye: No discharge  Left eye: No discharge  Neck:      Thyroid: No thyromegaly  Cardiovascular:      Rate and Rhythm: Normal rate  Rhythm irregular  Pulses: Normal pulses  Heart sounds: Normal heart sounds  No murmur heard  Pulmonary:      Effort: Pulmonary effort is normal  No accessory muscle usage or respiratory distress  Breath sounds: Examination of the right-lower field reveals decreased breath sounds  Examination of the left-lower field reveals decreased breath sounds  Decreased breath sounds present  Abdominal:      General: Bowel sounds are normal  There is no distension  Palpations: Abdomen is soft  Musculoskeletal:      Cervical back: Normal range of motion and neck supple        Right lower leg: No edema  Left lower leg: No edema  Skin:     General: Skin is warm and dry  Capillary Refill: Capillary refill takes less than 2 seconds  Neurological:      General: No focal deficit present  Mental Status: He is alert  Mental status is at baseline                     Medications/ Allergies:     Current Facility-Administered Medications   Medication Dose Route Frequency Provider Last Rate    atorvastatin  40 mg Oral Daily Gayathri Black, [place]      finasteride  5 mg Oral Daily [place], [place]      insulin lispro  1-5 Units Subcutaneous HS [place], [place]      insulin lispro  1-5 Units Subcutaneous TID AC Gayathri Black, [place]      metoprolol tartrate  12 5 mg Oral Q12H [address] [place], [place]      tamsulosin  0 4 mg Oral HS Gayathri Black, [place]      warfarin  5 mg Oral Daily (warfarin) Torey Alvarado MD          No Known Allergies    VTE Pharmacologic Prophylaxis:   Warfarin (Coumadin)    Labs:   Troponins:  Results from last 7 days   Lab Units 03/03/22  1740 03/03/22  1649   HSTNI D2 ng/L  --  -1   HSTNI D4 ng/L -1  --      CBC with diff:  Results from last 7 days   Lab Units 03/05/22  0505 03/04/22  0436 03/03/22  1436   WBC Thousand/uL 78 47* 90 04* 89 17*   HEMOGLOBIN g/dL 9 6* 9 2* 9 8*   HEMATOCRIT % 31 3* 30 6* 33 1*   MCV fL 102* 100* 101*   PLATELETS Thousands/uL 72* 63* 74*   MCH pg 31 2 30 2 30 0   MCHC g/dL 30 7* 30 1* 29 6*   RDW % 17 2* 17 1* 17 3*   MPV fL 9 5 9 9 10 0     CMP:  Results from last 7 days   Lab Units 03/05/22  0505 03/04/22  0436 03/03/22  1740 03/03/22  1436   SODIUM mmol/L 141 139 142 141   POTASSIUM mmol/L 4 5 5 0 5 2 6 0*   CHLORIDE mmol/L 109* 108 109* 107   CO2 mmol/L 24 22 23 22   ANION GAP mmol/L 8 9 10 12   BUN mg/dL 27* 35* 37* 36*   CREATININE mg/dL 0 96 1 29 1 50* 1 49*   CALCIUM mg/dL 8 1* 8 2* 8 0* 8 2*   AST U/L  --  50*  --  48*   ALT U/L  --  22  --  22   ALK PHOS U/L  --  103  --  107   TOTAL PROTEIN g/dL  --  5 8*  --  6 0* ALBUMIN g/dL  --  3 0*  --  3 2*   TOTAL BILIRUBIN mg/dL  --  1 24*  --  1 22*   EGFR ml/min/1 73sq m 73 51 42 43       Magnesium:  Results from last 7 days   Lab Units 03/05/22  0505 03/04/22  0436 03/03/22  1436   MAGNESIUM mg/dL 2 1 2 1 2 3     Coags:  Results from last 7 days   Lab Units 03/05/22  1130 03/04/22  0436 03/03/22  1438   PTT seconds  --   --  38*   INR  2 43* 3 32* 3 40*     NT-proBNP:   Recent Labs     03/03/22  1436   NTBNP 4,527*        Imaging & Testing   I have personally reviewed pertinent reports  Echo complete w/ contrast if indicated    Result Date: 3/3/2022  Narrative: Shruthi Swenson  Left Ventricle: Left ventricular cavity size is normal  Wall thickness is mildly increased  There is mild concentric hypertrophy  The left ventricular ejection fraction is 55% by visual estimation  Systolic function is normal  Wall motion is normal    Right Ventricle: Right ventricular cavity size is moderately dilated  Annular dilatation of RV noted Systolic function is mildly reduced  Wall thickness is increased    Left Atrium: The atrium is moderately dilated    Right Atrium: The atrium is severely dilated    Aortic Valve: There is mild regurgitation    Mitral Valve: There is mild annular calcification  There is mild regurgitation    Tricuspid Valve: There is severe regurgitation  Hard to accurately calculated pulmonary artery pressure due to wide open TR but appears to be around 35-40 mmHg  Piter Reese [place]  Cardiology      "This note has been constructed using a voice recognition system  Therefore there may be syntax, spelling, and/or grammatical errors   Please call if you have any questions  "

## 2022-03-06 NOTE — ASSESSMENT & PLAN NOTE
History of permanent atrial fibrillation  Patient is admitted hospital with atrial fibrillation with slow ventricular rate-likely etiology secondary to Cardizem use in the setting of acute kidney injury and dehydration  Heart rate has improved and patient was started back on metoprolol 12 5 milligram p o  B i d   Continue metoprolol and anticoagulation with Coumadin  INR was 2 4 and patient was restarted back on Coumadin 5 milligram p o   Daily  2D echo showed EF of 55% with severe tricuspid regurgitation

## 2022-03-06 NOTE — PLAN OF CARE
Problem: PAIN - ADULT  Goal: Verbalizes/displays adequate comfort level or baseline comfort level  Description: Interventions:  - Encourage patient to monitor pain and request assistance  - Assess pain using appropriate pain scale  - Administer analgesics based on type and severity of pain and evaluate response  - Implement non-pharmacological measures as appropriate and evaluate response  - Consider cultural and social influences on pain and pain management  - Notify physician/advanced practitioner if interventions unsuccessful or patient reports new pain  Outcome: Progressing     Problem: CARDIOVASCULAR - ADULT  Goal: Maintains optimal cardiac output and hemodynamic stability  Description: INTERVENTIONS:  - Monitor I/O, vital signs and rhythm  - Monitor for S/S and trends of decreased cardiac output  - Administer and titrate ordered vasoactive medications to optimize hemodynamic stability  - Assess quality of pulses, skin color and temperature  - Assess for signs of decreased coronary artery perfusion  - Instruct patient to report change in severity of symptoms  Outcome: Progressing  Goal: Absence of cardiac dysrhythmias or at baseline rhythm  Description: INTERVENTIONS:  - Continuous cardiac monitoring, vital signs, obtain 12 lead EKG if ordered  - Administer antiarrhythmic and heart rate control medications as ordered  - Monitor electrolytes and administer replacement therapy as ordered  Outcome: Progressing     Problem: CARDIOVASCULAR - ADULT  Goal: Absence of cardiac dysrhythmias or at baseline rhythm  Description: INTERVENTIONS:  - Continuous cardiac monitoring, vital signs, obtain 12 lead EKG if ordered  - Administer antiarrhythmic and heart rate control medications as ordered  - Monitor electrolytes and administer replacement therapy as ordered  Outcome: Progressing

## 2022-03-06 NOTE — PLAN OF CARE
Problem: PAIN - ADULT  Goal: Verbalizes/displays adequate comfort level or baseline comfort level  Description: Interventions:  - Encourage patient to monitor pain and request assistance  - Assess pain using appropriate pain scale  - Administer analgesics based on type and severity of pain and evaluate response  - Implement non-pharmacological measures as appropriate and evaluate response  - Consider cultural and social influences on pain and pain management  - Notify physician/advanced practitioner if interventions unsuccessful or patient reports new pain  Outcome: Progressing     Problem: INFECTION - ADULT  Goal: Absence or prevention of progression during hospitalization  Description: INTERVENTIONS:  - Assess and monitor for signs and symptoms of infection  - Monitor lab/diagnostic results  - Monitor all insertion sites, i e  indwelling lines, tubes, and drains  - Monitor endotracheal if appropriate and nasal secretions for changes in amount and color  - [place] appropriate cooling/warming therapies per order  - Administer medications as ordered  - Instruct and encourage patient and family to use good hand hygiene technique  - Identify and instruct in appropriate isolation precautions for identified infection/condition  Outcome: Progressing  Goal: Absence of fever/infection during neutropenic period  Description: INTERVENTIONS:  - Monitor WBC    Outcome: Progressing     Problem: SAFETY ADULT  Goal: Patient will remain free of falls  Description: INTERVENTIONS:  - Educate patient/family on patient safety including physical limitations  - Instruct patient to call for assistance with activity   - Consult OT/PT to assist with strengthening/mobility   - Keep Call bell within reach  - Keep bed low and locked with side rails adjusted as appropriate  - Keep care items and personal belongings within reach  - Initiate and maintain comfort rounds  - Make Fall Risk Sign visible to staff  - Apply yellow socks and bracelet for high fall risk patients  - Consider moving patient to room near nurses station  Outcome: Progressing  Goal: Maintain or return to baseline ADL function  Description: INTERVENTIONS:  -  Assess patient's ability to carry out ADLs; assess patient's baseline for ADL function and identify physical deficits which impact ability to perform ADLs (bathing, care of mouth/teeth, toileting, grooming, dressing, etc )  - Assess/evaluate cause of self-care deficits   - Assess range of motion  - Assess patient's mobility; develop plan if impaired  - Assess patient's need for assistive devices and provide as appropriate  - Encourage maximum independence but intervene and supervise when necessary  - Involve family in performance of ADLs  - Assess for home care needs following discharge   - Consider OT consult to assist with ADL evaluation and planning for discharge  - Provide patient education as appropriate  Outcome: Progressing  Goal: Maintains/Returns to pre admission functional level  Description: INTERVENTIONS:  - Perform BMAT or MOVE assessment daily    - Set and communicate daily mobility goal to care team and patient/family/caregiver     - Collaborate with rehabilitation services on mobility goals if consulted  - Out of bed for toileting  - Record patient progress and toleration of activity level   Outcome: Progressing     Problem: DISCHARGE PLANNING  Goal: Discharge to home or other facility with appropriate resources  Description: INTERVENTIONS:  - Identify barriers to discharge w/patient and caregiver  - Arrange for needed discharge resources and transportation as appropriate  - Identify discharge learning needs (meds, wound care, etc )  - Arrange for interpretive services to assist at discharge as needed  - Refer to Case Management Department for coordinating discharge planning if the patient needs post-hospital services based on physician/advanced practitioner order or complex needs related to functional status, cognitive ability, or social support system  Outcome: Progressing     Problem: Knowledge Deficit  Goal: Patient/family/caregiver demonstrates understanding of disease process, treatment plan, medications, and discharge instructions  Description: Complete learning assessment and assess knowledge base  Interventions:  - Provide teaching at level of understanding  - Provide teaching via preferred learning methods  Outcome: Progressing     Problem: Nutrition/Hydration-ADULT  Goal: Nutrient/Hydration intake appropriate for improving, restoring or maintaining nutritional needs  Description: Monitor and assess patient's nutrition/hydration status for malnutrition  Collaborate with interdisciplinary team and initiate plan and interventions as ordered  Monitor patient's weight and dietary intake as ordered or per policy  Utilize nutrition screening tool and intervene as necessary  Determine patient's food preferences and provide high-protein, high-caloric foods as appropriate       INTERVENTIONS:  - Monitor oral intake, urinary output, labs, and treatment plans  - Assess nutrition and hydration status and recommend course of action  - Evaluate amount of meals eaten  - Assist patient with eating if necessary   - Allow adequate time for meals  - Recommend/ encourage appropriate diets, oral nutritional supplements, and vitamin/mineral supplements  - Order, calculate, and assess calorie counts as needed  - Recommend, monitor, and adjust tube feedings and TPN/PPN based on assessed needs  - Assess need for intravenous fluids  - Provide specific nutrition/hydration education as appropriate  - Include patient/family/caregiver in decisions related to nutrition  Outcome: Progressing     Problem: CARDIOVASCULAR - ADULT  Goal: Maintains optimal cardiac output and hemodynamic stability  Description: INTERVENTIONS:  - Monitor I/O, vital signs and rhythm  - Monitor for S/S and trends of decreased cardiac output  - Administer and titrate ordered vasoactive medications to optimize hemodynamic stability  - Assess quality of pulses, skin color and temperature  - Assess for signs of decreased coronary artery perfusion  - Instruct patient to report change in severity of symptoms  Outcome: Progressing  Goal: Absence of cardiac dysrhythmias or at baseline rhythm  Description: INTERVENTIONS:  - Continuous cardiac monitoring, vital signs, obtain 12 lead EKG if ordered  - Administer antiarrhythmic and heart rate control medications as ordered  - Monitor electrolytes and administer replacement therapy as ordered  Outcome: Progressing     Problem: RESPIRATORY - ADULT  Goal: Achieves optimal ventilation and oxygenation  Description: INTERVENTIONS:  - Assess for changes in respiratory status  - Assess for changes in mentation and behavior  - Position to facilitate oxygenation and minimize respiratory effort  - Oxygen administered by appropriate delivery if ordered  - Initiate smoking cessation education as indicated  - Encourage broncho-pulmonary hygiene including cough, deep breathe, Incentive Spirometry  - Assess the need for suctioning and aspirate as needed  - Assess and instruct to report SOB or any respiratory difficulty  - Respiratory Therapy support as indicated  Outcome: Progressing     Problem: GASTROINTESTINAL - ADULT  Goal: Minimal or absence of nausea and/or vomiting  Description: INTERVENTIONS:  - Administer IV fluids if ordered to ensure adequate hydration  - Maintain NPO status until nausea and vomiting are resolved  - Nasogastric tube if ordered  - Administer ordered antiemetic medications as needed  - Provide nonpharmacologic comfort measures as appropriate  - Advance diet as tolerated, if ordered  - Consider nutrition services referral to assist patient with adequate nutrition and appropriate food choices  Outcome: Progressing  Goal: Maintains or returns to baseline bowel function  Description: INTERVENTIONS:  - Assess bowel function  - Encourage oral fluids to ensure adequate hydration  - Administer IV fluids if ordered to ensure adequate hydration  - Administer ordered medications as needed  - Encourage mobilization and activity  - Consider nutritional services referral to assist patient with adequate nutrition and appropriate food choices  Outcome: Progressing  Goal: Maintains adequate nutritional intake  Description: INTERVENTIONS:  - Monitor percentage of each meal consumed  - Identify factors contributing to decreased intake, treat as appropriate  - Assist with meals as needed  - Monitor I&O, weight, and lab values if indicated  - Obtain nutrition services referral as needed  Outcome: Progressing  Goal: Establish and maintain optimal ostomy function  Description: INTERVENTIONS:  - Assess bowel function  - Encourage oral fluids to ensure adequate hydration  - Administer IV fluids if ordered to ensure adequate hydration   - Administer ordered medications as needed  - Encourage mobilization and activity  - Nutrition services referral to assist patient with appropriate food choices  - Assess stoma site  - Consider wound care consult   Outcome: Progressing  Goal: Oral mucous membranes remain intact  Description: INTERVENTIONS  - Assess oral mucosa and hygiene practices  - Implement preventative oral hygiene regimen  - Implement oral medicated treatments as ordered  - Initiate Nutrition services referral as needed  Outcome: Progressing     Problem: MOBILITY - ADULT  Goal: Maintain or return to baseline ADL function  Description: INTERVENTIONS:  -  Assess patient's ability to carry out ADLs; assess patient's baseline for ADL function and identify physical deficits which impact ability to perform ADLs (bathing, care of mouth/teeth, toileting, grooming, dressing, etc )  - Assess/evaluate cause of self-care deficits   - Assess range of motion  - Assess patient's mobility; develop plan if impaired  - Assess patient's need for assistive devices and provide as appropriate  - Encourage maximum independence but intervene and supervise when necessary  - Involve family in performance of ADLs  - Assess for home care needs following discharge   - Consider OT consult to assist with ADL evaluation and planning for discharge  - Provide patient education as appropriate  Outcome: Progressing  Goal: Maintains/Returns to pre admission functional level  Description: INTERVENTIONS:  - Perform BMAT or MOVE assessment daily    - Set and communicate daily mobility goal to care team and patient/family/caregiver     - Collaborate with rehabilitation services on mobility goals if consulted  - Out of bed for toileting  - Record patient progress and toleration of activity level   Outcome: Progressing     Problem: Potential for Falls  Goal: Patient will remain free of falls  Description: INTERVENTIONS:  - Educate patient/family on patient safety including physical limitations  - Instruct patient to call for assistance with activity   - Consult OT/PT to assist with strengthening/mobility   - Keep Call bell within reach  - Keep bed low and locked with side rails adjusted as appropriate  - Keep care items and personal belongings within reach  - Initiate and maintain comfort rounds  - Make Fall Risk Sign visible to staff  - Apply yellow socks and bracelet for high fall risk patients  - Consider moving patient to room near nurses station  Outcome: Progressing

## 2022-03-08 ENCOUNTER — TELEPHONE (OUTPATIENT)
Dept: CARDIOLOGY CLINIC | Facility: CLINIC | Age: 82
End: 2022-03-08

## 2022-03-08 NOTE — TELEPHONE ENCOUNTER
dtr-in-law called Lazara Urbano was d/c on Sunday called regarding is he to start cardiac rehab or physical therapy

## 2022-03-09 LAB
BACTERIA BLD CULT: NORMAL
BACTERIA BLD CULT: NORMAL

## 2022-03-10 ENCOUNTER — APPOINTMENT (OUTPATIENT)
Dept: LAB | Facility: HOSPITAL | Age: 82
End: 2022-03-10
Attending: INTERNAL MEDICINE
Payer: MEDICARE

## 2022-03-10 DIAGNOSIS — I48.91 ATRIAL FIBRILLATION (HCC): ICD-10-CM

## 2022-03-10 LAB
INR PPP: 1.96 (ref 0.84–1.19)
PROTHROMBIN TIME: 21.8 SECONDS (ref 11.6–14.5)

## 2022-03-10 PROCEDURE — 36415 COLL VENOUS BLD VENIPUNCTURE: CPT

## 2022-03-10 PROCEDURE — 85610 PROTHROMBIN TIME: CPT

## 2022-03-11 NOTE — TELEPHONE ENCOUNTER
Spoke to patient's daughter and up  She want to switch care to us    She will call us for appointment

## 2022-03-17 ENCOUNTER — TELEPHONE (OUTPATIENT)
Dept: UROLOGY | Facility: AMBULATORY SURGERY CENTER | Age: 82
End: 2022-03-17

## 2022-03-17 NOTE — TELEPHONE ENCOUNTER
Patient is currently with scheduled for hospital follow up appointment for 4/19/22 with ALECIA HUFF

## 2022-03-17 NOTE — TELEPHONE ENCOUNTER
For urinary symptoms?  Typically this is not an appointment with MDs due to acuity unless undergoing cystoscopy and surgical workup

## 2022-03-17 NOTE — TELEPHONE ENCOUNTER
Pt was under car of Dr PULLIAM [facility] Follow up and additional urinary issues     Pt daughter in law is calling for an hospital follow up and requesting to have pt seen by Dr Beryle [place] as this pt was a prior pt of Billie Alonso and daughter in law previously saw Billie Alonso  Pt was in hospital however is having issues with incontinence and frequent urination       Daughter in law can be reached at [phone]

## 2022-04-18 PROBLEM — N13.8 BPH WITH OBSTRUCTION/LOWER URINARY TRACT SYMPTOMS: Status: ACTIVE | Noted: 2021-07-26

## 2022-04-18 PROBLEM — R35.1 NOCTURIA: Status: ACTIVE | Noted: 2022-04-18

## 2022-04-18 NOTE — PROGRESS NOTES
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 lower urinary tract symptoms    Nocturia    BPH with obstruction/lower urinary tract symptoms  -     POCT Measure PVR            Assessment and plan:       1  History of BPH  -distant history of prior TURP at [facility]  -maintained on tamsulosin and finasteride    2  Geriatric incontinence    I recommended continuing medical management continued conservative therapy  Patient is very interested in an anatomic workup to determine if there are any correctable sources of his post micturition dribbling and persistent frequency and urgency  We discussed the role cystoscopy transrectal ultrasound as a diagnostic procedure and he would like to move forward with this  I recommended flexible cystoscopy for further evaluation  Discussed the risks benefits and alternatives to this diagnostic procedure  Risks include but are not limited to hematuria, pain, urinary tract infection, stricture formation, possible need for hospitalization  Patient verbalized understanding and has elected to proceed  Cystoscopy and transrectal ultrasound will be scheduled in the near future  Zaian Velasquez MD      Chief Complaint     BPH follow-up      History of Present Illness     Antolin Schafer is a 79 y o  male returns in follow-up  He has been managed by my colleagues Dr Jimenez Jarod the past   He is maintained on combination medical therapy with tamsulosin finasteride at this point time for many years  He has a remote history of a TURP  He is bothered by ongoing frequency urgency symptoms as well as post micturition dribbling  Medical comorbidities include octogenarians status, diabetes, diuretic use and atrial fibrillation on chronic anticoagulation        He has had no trouble with hematuria or UTIs  Detailed Urologic History     - please refer to HPI    Review of Systems     Review of Systems   Constitutional: Negative for activity change and fatigue  HENT: Negative for congestion  Eyes: Negative for visual disturbance  Respiratory: Negative for shortness of breath and wheezing  Cardiovascular: Negative for chest pain and leg swelling  Gastrointestinal: Negative for abdominal pain  Endocrine: Positive for polyuria  Genitourinary: Positive for dysuria  Negative for flank pain, hematuria and urgency  Musculoskeletal: Negative for back pain  Allergic/Immunologic: Negative for immunocompromised state  Neurological: Negative for dizziness and numbness  Psychiatric/Behavioral: Negative for dysphoric mood  All other systems reviewed and are negative  Allergies     No Known Allergies    Physical Exam     Physical Exam  Constitutional:       General: He is not in acute distress  Appearance: He is well-developed  HENT:      Head: Normocephalic and atraumatic  Cardiovascular:      Comments: Negative lower extremity edema  Pulmonary:      Effort: Pulmonary effort is normal       Breath sounds: Normal breath sounds  Abdominal:      Palpations: Abdomen is soft  Genitourinary:     Comments: Negative CVA tenderness, suprapubic tenderness  Musculoskeletal:         General: Normal range of motion  Cervical back: Normal range of motion  Skin:     General: Skin is warm  Neurological:      Mental Status: He is alert and oriented to person, place, and time  Psychiatric:         Behavior: Behavior normal              Vital Signs  There were no vitals filed for this visit  Current Medications       Current Outpatient Medications:     atorvastatin (LIPITOR) 40 mg tablet, Take 40 mg by mouth daily  , Disp: , Rfl:     Cholecalciferol 5000 units capsule, 1 tab daily, Disp: , Rfl:     finasteride (PROSCAR) 5 mg tablet, Take 1 tablet (5 mg total) by mouth daily, Disp: 90 tablet, Rfl: 3    furosemide (LASIX) 20 mg tablet, Take 1 tablet (20 mg total) by mouth daily as needed (Shortness of breath, leg swelling, weight gain  3 lbs in a day or 5 lbs in a week) Take 2 times a week   On Tuesday and Friday, Disp: , Rfl: 0    iron polysaccharides (FERREX) 150 mg capsule, Take 150 mg by mouth 2 (two) times a day, Disp: , Rfl:     metFORMIN (GLUCOPHAGE-XR) 500 mg 24 hr tablet, Take 500 mg by mouth daily with breakfast, Disp: , Rfl:     metoprolol tartrate (LOPRESSOR) 25 mg tablet, Take 0 5 tablets (12 5 mg total) by mouth every 12 (twelve) hours, Disp: 45 tablet, Rfl: 1    tamsulosin (FLOMAX) 0 4 mg, Take 1 capsule (0 4 mg total) by mouth daily at bedtime, Disp: 90 capsule, Rfl: 3    warfarin (COUMADIN) 5 mg tablet, Take 5 mg by mouth daily  , Disp: , Rfl:       Active Problems     Patient Active Problem List   Diagnosis    Dribbling following urination    BPH with obstruction/lower urinary tract symptoms    Atrial fibrillation (HCC)    Symptomatic bradycardia    CLL (chronic lymphocytic leukemia) (HCC)    Anticoagulated on Coumadin    Hyperlipidemia    Bilateral lower extremity edema    Type 2 diabetes mellitus ([address] )    [facility]acute [facility] [address] [facility]    Severe tricuspid valve regurgitation    Hard of hearing    Nocturia         Past Medical History     Past Medical History:   Diagnosis Date    Asymptomatic microscopic hematuria     BPH with obstruction/lower urinary tract symptoms     BPH without obstruction/lower urinary tract symptoms     Cardiac disease     Elevated PSA     Family history of malignant neoplasm of prostate     Feeling of incomplete bladder emptying     GERD (gastroesophageal reflux disease)     Hyperlipidemia     Kidney stone     Nocturia     Other specified disorders of kidney and ureter     Personal history of urinary calculi          Surgical History     Past Surgical History:   Procedure Laterality Date    BACK SURGERY  12/03/2019    CYSTOSCOPY  2016    PROSTATE BIOPSY  2016    TOTAL HIP ARTHROPLASTY Left 02/2021    TRANSURETHRAL RESECTION OF PROSTATE  1998         Family History     Family History   Problem Relation Age of Onset    Other Mother         sepsis    Prostate cancer Father          Social History     Social History     Social History     Tobacco Use   Smoking Status Never Smoker   Smokeless Tobacco Never Used         Pertinent Lab Values     Lab Results   Component Value Date    CREATININE 0 96 03/05/2022       Lab Results   Component Value Date    PSA 1 2 07/20/2020    PSA 0 9 12/20/2019    PSA 2 3 07/19/2018       @RESULTRCNT(1H])@      Pertinent Imaging      - n/a    Portions of the record may have been created with voice recognition software   Occasional wrong word or "sound a like" substitutions may have occurred due to the inherent limitations of voice recognition software   Read the chart carefully and recognize, using context, where substitutions have occurred

## 2022-04-19 ENCOUNTER — OFFICE VISIT (OUTPATIENT)
Dept: UROLOGY | Facility: CLINIC | Age: 82
End: 2022-04-19
Payer: MEDICARE

## 2022-04-19 VITALS
SYSTOLIC BLOOD PRESSURE: 110 MMHG | BODY MASS INDEX: 25.61 KG/M2 | OXYGEN SATURATION: 96 % | WEIGHT: 169 LBS | DIASTOLIC BLOOD PRESSURE: 58 MMHG | HEIGHT: 68 IN | HEART RATE: 67 BPM | RESPIRATION RATE: 16 BRPM

## 2022-04-19 DIAGNOSIS — N40.1 BENIGN LOCALIZED HYPERPLASIA OF PROSTATE WITH URINARY OBSTRUCTION AND LOWER URINARY TRACT SYMPTOMS: Primary | ICD-10-CM

## 2022-04-19 DIAGNOSIS — N13.9 BENIGN LOCALIZED HYPERPLASIA OF PROSTATE WITH URINARY OBSTRUCTION AND LOWER URINARY TRACT SYMPTOMS: Primary | ICD-10-CM

## 2022-04-19 DIAGNOSIS — N13.8 BPH WITH OBSTRUCTION/LOWER URINARY TRACT SYMPTOMS: ICD-10-CM

## 2022-04-19 DIAGNOSIS — R35.1 NOCTURIA: ICD-10-CM

## 2022-04-19 DIAGNOSIS — N40.1 BPH WITH OBSTRUCTION/LOWER URINARY TRACT SYMPTOMS: ICD-10-CM

## 2022-04-19 LAB — POST-VOID RESIDUAL VOLUME, ML POC: 39 ML

## 2022-04-19 PROCEDURE — 51798 US URINE CAPACITY MEASURE: CPT | Performed by: UROLOGY

## 2022-04-19 PROCEDURE — 99214 OFFICE O/P EST MOD 30 MIN: CPT | Performed by: UROLOGY

## 2022-08-17 ENCOUNTER — PROCEDURE VISIT (OUTPATIENT)
Dept: UROLOGY | Facility: CLINIC | Age: 82
End: 2022-08-17
Payer: MEDICARE

## 2022-08-17 VITALS
DIASTOLIC BLOOD PRESSURE: 62 MMHG | HEIGHT: 68 IN | SYSTOLIC BLOOD PRESSURE: 124 MMHG | OXYGEN SATURATION: 98 % | WEIGHT: 169 LBS | HEART RATE: 86 BPM | BODY MASS INDEX: 25.61 KG/M2

## 2022-08-17 DIAGNOSIS — N40.1 BPH WITH OBSTRUCTION/LOWER URINARY TRACT SYMPTOMS: Primary | ICD-10-CM

## 2022-08-17 DIAGNOSIS — N39.43 DRIBBLING FOLLOWING URINATION: ICD-10-CM

## 2022-08-17 DIAGNOSIS — N13.8 BPH WITH OBSTRUCTION/LOWER URINARY TRACT SYMPTOMS: Primary | ICD-10-CM

## 2022-08-17 PROCEDURE — 52000 CYSTOURETHROSCOPY: CPT | Performed by: UROLOGY

## 2022-08-17 RX ORDER — PROCHLORPERAZINE MALEATE 10 MG
10 TABLET ORAL EVERY 6 HOURS PRN
COMMUNITY
Start: 2022-05-31

## 2022-08-17 RX ORDER — ALLOPURINOL 300 MG/1
300 TABLET ORAL DAILY
COMMUNITY
Start: 2022-07-26 | End: 2022-10-24

## 2022-08-17 NOTE — PROGRESS NOTES
Cystoscopy     Date/Time 8/17/2022 8:56 AM     Performed by  Piero Vallecillo MD     Authorized by Piero Vallecillo MD          Procedure Details:  Procedure type: cystoscopy      Office Cystoscopy Procedure Note    Indication:    Medically refractory lower urinary tract symptoms    Informed consent   The risks, benefits, complications, treatment options, and expected outcomes were discussed with the patient  The patient concurred with the proposed plan and provided informed consent  Anesthesia  Lidocaine jelly 2%    Procedure  The patient was placed in the supineposition, was prepped and draped in the usual manner using sterile technique, and 2% lidocaine jelly instilled into the urethra  A 17 F flexible cystoscope was then inserted into the urethra and the urethra and bladder carefully examined  The following findings were noted:    Findings:  Urethra:  Normal  Prostate:  Normal  Bladder:  Normal  Ureteral orifices:  Normal  Other findings:  None       Office TRUS    Indication    Prostate volumetrics for surgical planning    Transrectal ultrasonography  After completing the cystoscopy, the patient was placed in the left lateral decubitus position  After an attentive digital rectal examination, a 7 5 mHz sidefire ultrasound probe was gently inserted into the rectum and biplanar imaging of the prostate was done with the findings noted below  Images were taken of any abnormal findings and also to document prostate size  Bladder  The bladder base appeared normal     Prostate      Ultrasound size measurements:  -Volume:  *** cm3    Ultrasound findings:  -Cysts: None  -Masses: None  -Median lobe: {Blank single:64773::"absent","present"}        Specimens: {Blank single:70987::"None"," sterile urine culture collected through cystoscope"}                 Complications:    None; patient tolerated the procedure well           Disposition: To home after 30 minute observation             Condition: Stable

## 2022-08-17 NOTE — PROGRESS NOTES
Cystoscopy     Date/Time 8/17/2022 3:47 PM     Performed by  Lashaun Fonseca MD     Authorized by Lashaun Fonseca MD          Procedure Details:  Procedure type: cystoscopy        Office Cystoscopy Procedure Note    Indication:    BPH workup    Informed consent   The risks, benefits, complications, treatment options, and expected outcomes were discussed with the patient  The patient concurred with the proposed plan and provided informed consent  Anesthesia  Lidocaine jelly 2%    Procedure  The patient was placed in the supineposition, was prepped and draped in the usual manner using sterile technique, and 2% lidocaine jelly instilled into the urethra  A 17 F flexible cystoscope was then inserted into the urethra and the urethra and bladder carefully examined  The following findings were noted:    Findings:  Urethra:  Normal  Prostate:  Evidence of prior TURP  There is an open anterior channel  There is a minimal amount of regrowth apically however the bladder outlet is essentially open  Poor coaptation of the external sphincter is noted likely related to age  Bladder:  Normal  Ureteral orifices:  Normal  Other findings:  None     Specimens: None                 Complications:    None; patient tolerated the procedure well           Disposition: To home after 30 minute observation             Condition: Stable    Plan:     Continue medical management    Provided education on Kegel exercises to minimize his post micturition dribbling    Follow-up in a year

## 2022-08-17 NOTE — PATIENT INSTRUCTIONS
Pelvic Muscle (Kegel) Exercises   WHAT ARE PELVIC FLOOR MUSCLES? Pelvic muscles hold your bladder and bowel in place   In women, they also support the uterus (womb)  Pelvic muscles can be weakened by common life events--straining repeatedly to empty your bladder or bowels, being overweight, having a baby, etc  Often, when the pelvic floor muscles are weak, you may have problems with losing urine when you cough or sneeze or exercise  WHAT ARE PELVIC FLOOR MUSCLE (PME) EXERCISES? Luckily, when these muscles do get weak, you can help make them strong again  Pelvic floor exercises (sometimes called Kegels exercises) are done to strengthen the muscles located around the opening of the bladder and the bowel  Just like your other muscles in your body, exercises can make your pelvic floor muscles stronger  Doing the exercises correctly and regularly can strengthen the muscles  Stronger muscles can lead to little or no urine loss for many women and for some men  They are risk-free, low-cost and painless! HOW DO I DO THEM? Like many exercises, these take a little extra practice at the beginning  It is very important that you perform these exercises correctly  Getting Started: The first step is finding the right muscles  It may take several tries  In fact, many of us start out squeezing the wrong muscles  To locate the muscles, it is best to sit down  Imagine that you are trying to stop passing gas or having a bowel motion  Squeeze the muscles you would use  These muscles are part of the same group that controls the opening of the bladder  It is important to exercise the right muscles  It can be helpful to work with a doctor or nurse who can teach you the correct technique  For men, you may feel your penis pull in slightly toward your body  For women, you (or your doctor or nurse) can check: Lie down and put a finger inside your vagina  Squeeze the pelvic muscles   When you feel pressure around your finger, you know you are using the correct muscle  See your health care provider if you have any difficulty identifying these muscles  The next step, once you have found the right muscles, is to try to keep everything relaxed except these muscles  Be careful not to hold your breath or tighten the muscles in your buttocks or abdomen  Squeezing the wrong muscles can put more pressure on your bladder control muscles  Breathe slowly and deeply while you do the exercises  Final step is to find a quiet spot to practice--usually your bathroom or bedroom--so you can concentrate  To start, you may want to lie down or sit down with your knees apart  Getting into the Routine:   Be sure you are doing the exercises correctly before you start  Remember to imagine that youre trying to stop from passing urine or gas  Pull in or squeeze the pelvic muscles  Hold the squeeze for a slow count of 3 (1-and-2-and-3)  Relax for a count of 3  Repeat, but dont overdo it  Each repeat of squeezing and then relaxing is one repetition  Every day, you can do either   two sets of repetitions for 5 minutes each or   four or five sets of 10-15 repetitions per set  Either of these plans will reach the target of at least 50 repetitions per day  We recommend gradually working up to the full series of exercises  At first, you may not be able to hold the squeeze continuously for a slow count of 3 or to do the exercises for a full set of 10 or a whole 5 minutes  With practice, it will become easier as the muscles get stronger  As you get better, you might try increasing the squeeze count to 5 and the relax count to 5  Be patient to see results  Dont give up  Remember its just a few minutes, a few times each day  WHERE AND WHEN SHOULD I DO THE EXERCISES? What usually works best is regular sessions, built in to your schedule at regular times each day   Many people report that 5 minutes before they get up in the morning and 5 minutes before they sleep is a helpful routine  At the beginning, it usually does not work as well to try and remember to do them whenever you think of it  Once you are in the routine, it then can help to do help to do extra exercises with other activities, such as watching television, ironing, or relaxing  You might want to try adding the so-called Feli [place] when you are doing these other activities  In performing the Feli [place], the pelvic muscles are rapidly tightened and relaxed 10 times  HOW LONG DO I HAVE TO DO THE EXERCISES? It usually takes from 6-12 weeks for most women or men to notice a change in urine loss  Once you have attained your goal, you can do the exercises for 5 minutes, 3 times a week  If you start having problems again with urine loss, you may need to go back to the practice routine of several times each day  Like most exercises, these may be most helpful if they become a lifelong practice  Regular periodic follow-ups with your physician will assess the benefits of these exercises as well as provide the opportunity to review your exercise technique  BEWARE THE SNEEZE!! You can protect yourself and your pelvic muscles by bracing yourself before sudden pressure from actions such as sneezing, lifting, or jumping  Try to think ahead  Just before, squeeze your pelvic muscles tightly and hold on until after you sneeze, lift or jump  Hold the squeeze til after the sneeze   After you train yourself to tighten the pelvic muscles for these moments, you may have fewer accidents where you lose urine  Self-Care Strategies to help reduce leaking of urine:    Practice double-voiding: empty your bladder as much as possible, relax for a minute, and try again  Wear clothing that is easy to remove (e g  pants with an elastic waistband)  Use absorbent undergarments or a pad to help capture leaking urine   Look for products that help control odors  Take good care of the skin around the genital area  For women: (1) Cross your legs to stop urine leakage from coughing, sneezing, or laughing  (2) Insert a tampon when exercising  Retrain your bladder by urinating on a schedule that gradually increases the time between trips to the bathroom  If your symptoms do not improve, ask your doctor  Also, if you are concerned about any difference in your treatment plan and the information in this handout, you are advised to contact the doctor, nurse or therapist who is helping you with the exercise plan  HELPFUL HINTS:    Listening to music when you do the exercises can make it more fun! Once you are in the routine, you can do the exercises while doing other things, such as driving, watching television or making dinner  Remember not to regularly do pelvic floor exercises while youre urinating  This could weaken the muscles  Keep a calendar and give yourself a checkmark or star each time you do the exercises  This will help you keep track of your program and remind you when you forget  If you do stop doing the exercises, start again! Just remember, it takes regular practice to see positive results

## 2022-08-17 NOTE — PROGRESS NOTES
Referring Physician: Bashir Thomas DO  A copy of this note was sent to the referring physician  Diagnoses and all orders for this visit:    BPH with obstruction/lower urinary tract symptoms  -     Cystoscopy    Dribbling following urination  -     Cystoscopy    Other orders  -     prochlorperazine (COMPAZINE) 10 mg tablet; Take 10 mg by mouth every 6 (six) hours as needed  -     Venetoclax 100 MG TABS; Take 400 mg by mouth daily  -     allopurinol (ZYLOPRIM) 300 mg tablet; Take 300 mg by mouth daily            Assessment and plan:       1  Medically refractory lower urinary tract symptoms  -on tamsulosin and finasteride  -    2  Medical comorbidities:  Type 2 diabetes, tricuspid regurgitation on chronic Coumadin, diuretic use      Tasia Marcos MD      Chief Complaint     BPH workup      History of Present Illness     Wily Thomas is a 79 y o  male returns in follow-up  This is an 23-year-old male with a longstanding history of lower urinary tract symptoms  He is currently maintained on maximum medical therapy with tamsulosin and finasteride  Medical comorbidities include diabetes, valvular heart disease on chronic Coumadin and p r n  Lasix  Detailed Urologic History     - please refer to HPI    Review of Systems     Review of Systems   Constitutional: Negative for activity change and fatigue  HENT: Negative for congestion  Eyes: Negative for visual disturbance  Respiratory: Negative for shortness of breath and wheezing  Cardiovascular: Negative for chest pain and leg swelling  Gastrointestinal: Negative for abdominal pain  Endocrine: Negative for polyuria  Genitourinary: Negative for dysuria, flank pain, hematuria and urgency  Musculoskeletal: Negative for back pain  Allergic/Immunologic: Negative for immunocompromised state  Neurological: Negative for dizziness and numbness  Psychiatric/Behavioral: Negative for dysphoric mood     All other systems reviewed and are negative  Allergies     No Known Allergies    Physical Exam       Physical Exam  Constitutional:       General: He is not in acute distress  Appearance: He is well-developed  HENT:      Head: Normocephalic and atraumatic  Cardiovascular:      Comments: Negative lower extremity edema  Pulmonary:      Effort: Pulmonary effort is normal       Breath sounds: Normal breath sounds  Abdominal:      Palpations: Abdomen is soft  Musculoskeletal:         General: Normal range of motion  Cervical back: Normal range of motion  Skin:     General: Skin is warm  Neurological:      Mental Status: He is alert and oriented to person, place, and time  Psychiatric:         Behavior: Behavior normal            Vital Signs  There were no vitals filed for this visit  Current Medications       Current Outpatient Medications:     atorvastatin (LIPITOR) 40 mg tablet, Take 40 mg by mouth daily  , Disp: , Rfl:     Cholecalciferol 5000 units capsule, 1 tab daily, Disp: , Rfl:     finasteride (PROSCAR) 5 mg tablet, Take 1 tablet (5 mg total) by mouth daily, Disp: 90 tablet, Rfl: 3    furosemide (LASIX) 20 mg tablet, Take 1 tablet (20 mg total) by mouth daily as needed (Shortness of breath, leg swelling, weight gain  3 lbs in a day or 5 lbs in a week) Take 2 times a week   On Tuesday and Friday, Disp: , Rfl: 0    iron polysaccharides (FERREX) 150 mg capsule, Take 150 mg by mouth 2 (two) times a day, Disp: , Rfl:     metFORMIN (GLUCOPHAGE-XR) 500 mg 24 hr tablet, Take 500 mg by mouth daily with breakfast, Disp: , Rfl:     metoprolol tartrate (LOPRESSOR) 25 mg tablet, Take 0 5 tablets (12 5 mg total) by mouth every 12 (twelve) hours, Disp: 45 tablet, Rfl: 1    tamsulosin (FLOMAX) 0 4 mg, Take 1 capsule (0 4 mg total) by mouth daily at bedtime, Disp: 90 capsule, Rfl: 3    warfarin (COUMADIN) 5 mg tablet, Take 5 mg by mouth daily  , Disp: , Rfl:       Active Problems     Patient Active Problem List Diagnosis    Dribbling following urination    BPH with obstruction/lower urinary tract symptoms    Atrial fibrillation (HCC)    Symptomatic bradycardia    CLL (chronic lymphocytic leukemia) (HCC)    Anticoagulated on Coumadin    Hyperlipidemia    Bilateral lower extremity edema    Type 2 diabetes mellitus (HCC)    PLACIDO (acute kidney injury) ([address] )    Severe tricuspid valve regurgitation    Hard of hearing    Nocturia         Past Medical History     Past Medical History:   Diagnosis Date    Asymptomatic microscopic hematuria     BPH with obstruction/lower urinary tract symptoms     BPH without obstruction/lower urinary tract symptoms     Cardiac disease     Elevated PSA     Family history of malignant neoplasm of prostate     Feeling of incomplete bladder emptying     GERD (gastroesophageal reflux disease)     Hyperlipidemia     Kidney stone     Nocturia     Other specified disorders of kidney and ureter     Personal history of urinary calculi          Surgical History     Past Surgical History:   Procedure Laterality Date    BACK SURGERY  12/03/2019    CYSTOSCOPY  2016    PROSTATE BIOPSY  2016    TOTAL HIP ARTHROPLASTY Left 02/2021    TRANSURETHRAL RESECTION OF PROSTATE  1998         Family History     Family History   Problem Relation Age of Onset    Other Mother         sepsis    Prostate cancer Father          Social History     Social History     Social History     Tobacco Use   Smoking Status Never Smoker   Smokeless Tobacco Never Used         Pertinent Lab Values     Lab Results   Component Value Date    CREATININE 0 96 03/05/2022       Lab Results   Component Value Date    PSA 1 2 07/20/2020    PSA 0 9 12/20/2019    PSA 2 3 07/19/2018       @RESULTRCNT(1H])@      Pertinent Imaging      - n/a    Portions of the record may have been created with voice recognition software    Occasional wrong word or "sound a like" substitutions may have occurred due to the inherent limitations of voice recognition software  Read the chart carefully and recognize, using context, where substitutions have occurred

## 2022-09-27 DIAGNOSIS — N13.9 BENIGN LOCALIZED HYPERPLASIA OF PROSTATE WITH URINARY OBSTRUCTION AND LOWER URINARY TRACT SYMPTOMS: ICD-10-CM

## 2022-09-27 DIAGNOSIS — N40.1 BENIGN LOCALIZED HYPERPLASIA OF PROSTATE WITH URINARY OBSTRUCTION AND LOWER URINARY TRACT SYMPTOMS: ICD-10-CM

## 2022-09-28 RX ORDER — TAMSULOSIN HYDROCHLORIDE 0.4 MG/1
0.4 CAPSULE ORAL
Qty: 90 CAPSULE | Refills: 3 | Status: SHIPPED | OUTPATIENT
Start: 2022-09-28

## 2022-11-03 ENCOUNTER — TELEPHONE (OUTPATIENT)
Dept: UROLOGY | Facility: AMBULATORY SURGERY CENTER | Age: 82
End: 2022-11-03

## 2022-11-03 DIAGNOSIS — N40.1 BENIGN LOCALIZED HYPERPLASIA OF PROSTATE WITH URINARY OBSTRUCTION AND LOWER URINARY TRACT SYMPTOMS: ICD-10-CM

## 2022-11-03 DIAGNOSIS — N13.9 BENIGN LOCALIZED HYPERPLASIA OF PROSTATE WITH URINARY OBSTRUCTION AND LOWER URINARY TRACT SYMPTOMS: ICD-10-CM

## 2022-11-03 RX ORDER — FINASTERIDE 5 MG/1
5 TABLET, FILM COATED ORAL DAILY
Qty: 90 TABLET | Refills: 3 | Status: SHIPPED | OUTPATIENT
Start: 2022-11-03

## 2022-11-03 NOTE — TELEPHONE ENCOUNTER
Medication Refill     finasteride (PROSCAR) 5 mg tablet        Pt states that he has not had this refilled by us let but now he is a patient of dr Collier Duane and would like to have them refilled       He would  Like a 90 refill and sent to [address] [place] [address]     Pt can be reached at [phone]

## 2023-10-18 DIAGNOSIS — N13.9 BENIGN LOCALIZED HYPERPLASIA OF PROSTATE WITH URINARY OBSTRUCTION AND LOWER URINARY TRACT SYMPTOMS: ICD-10-CM

## 2023-10-18 DIAGNOSIS — N40.1 BENIGN LOCALIZED HYPERPLASIA OF PROSTATE WITH URINARY OBSTRUCTION AND LOWER URINARY TRACT SYMPTOMS: ICD-10-CM

## 2023-10-18 RX ORDER — TAMSULOSIN HYDROCHLORIDE 0.4 MG/1
0.4 CAPSULE ORAL
Qty: 90 CAPSULE | Refills: 3 | Status: SHIPPED | OUTPATIENT
Start: 2023-10-18

## 2023-10-18 NOTE — TELEPHONE ENCOUNTER
Reason for call:   [x] Refill   [] Prior Auth  [] Other:     Office:   [] PCP/Provider -   [x] Specialty/Provider - Liliana Blake     Medication: Tamsulosin 0.4 mg- Take 1 tablet by mouth daily. Quantity: 90    [facility]    Does the patient have enough for 3 days?    [] Yes   [x] No - Send as HP to POD

## 2023-11-07 DIAGNOSIS — N13.9 BENIGN LOCALIZED HYPERPLASIA OF PROSTATE WITH URINARY OBSTRUCTION AND LOWER URINARY TRACT SYMPTOMS: ICD-10-CM

## 2023-11-07 DIAGNOSIS — N40.1 BENIGN LOCALIZED HYPERPLASIA OF PROSTATE WITH URINARY OBSTRUCTION AND LOWER URINARY TRACT SYMPTOMS: ICD-10-CM

## 2023-11-07 RX ORDER — FINASTERIDE 5 MG/1
5 TABLET, FILM COATED ORAL DAILY
Qty: 30 TABLET | Refills: 0 | Status: SHIPPED | OUTPATIENT
Start: 2023-11-07

## 2023-12-08 DIAGNOSIS — N13.9 BENIGN LOCALIZED HYPERPLASIA OF PROSTATE WITH URINARY OBSTRUCTION AND LOWER URINARY TRACT SYMPTOMS: ICD-10-CM

## 2023-12-08 DIAGNOSIS — N40.1 BENIGN LOCALIZED HYPERPLASIA OF PROSTATE WITH URINARY OBSTRUCTION AND LOWER URINARY TRACT SYMPTOMS: ICD-10-CM

## 2023-12-08 RX ORDER — FINASTERIDE 5 MG/1
5 TABLET, FILM COATED ORAL DAILY
Qty: 30 TABLET | Refills: 0 | Status: SHIPPED | OUTPATIENT
Start: 2023-12-08

## 2023-12-08 NOTE — TELEPHONE ENCOUNTER
Pt would like a 90 day supply     Reason for call:   [x] Refill   [] Prior Auth  [] Other:     Office:   [] PCP/Provider -   [x] Specialty/Provider - urology/ jonel     Medication: finasteride     Dose/Frequency: 5 mg/ daily     Quantity: 90 day supply     Pharmacy: [facility]ABI[facility]ORGENSEN     Does the patient have enough for 3 days?    [x] Yes   [] No - Send as HP to POD

## 2024-01-11 ENCOUNTER — OFFICE VISIT (OUTPATIENT)
Dept: UROLOGY | Facility: CLINIC | Age: 84
End: 2024-01-11
Payer: MEDICARE

## 2024-01-11 VITALS
WEIGHT: 169.8 LBS | HEIGHT: 68 IN | HEART RATE: 73 BPM | OXYGEN SATURATION: 98 % | DIASTOLIC BLOOD PRESSURE: 78 MMHG | BODY MASS INDEX: 25.73 KG/M2 | SYSTOLIC BLOOD PRESSURE: 118 MMHG

## 2024-01-11 DIAGNOSIS — N13.9 BENIGN LOCALIZED HYPERPLASIA OF PROSTATE WITH URINARY OBSTRUCTION AND LOWER URINARY TRACT SYMPTOMS: ICD-10-CM

## 2024-01-11 DIAGNOSIS — N40.1 BENIGN LOCALIZED HYPERPLASIA OF PROSTATE WITH URINARY OBSTRUCTION AND LOWER URINARY TRACT SYMPTOMS: ICD-10-CM

## 2024-01-11 PROCEDURE — 99213 OFFICE O/P EST LOW 20 MIN: CPT

## 2024-01-11 RX ORDER — FINASTERIDE 5 MG/1
5 TABLET, FILM COATED ORAL DAILY
Qty: 90 TABLET | Refills: 3 | Status: SHIPPED | OUTPATIENT
Start: 2024-01-11

## 2024-01-11 RX ORDER — TAMSULOSIN HYDROCHLORIDE 0.4 MG/1
0.8 CAPSULE ORAL
Qty: 90 CAPSULE | Refills: 3 | Status: SHIPPED | OUTPATIENT
Start: 2024-01-11

## 2024-01-11 NOTE — PROGRESS NOTES
Office Visit- Urology  Charlie Morales 1940 MRN: [id number]      Assessment/Discussion/Plan    83 y.o. male managed by     BPH with lower urinary tract symptoms  -S/p cystoscopy in August 2022 with evidence of minimal regrowth from prior TURP  -He was advised by Dr. Ramirez at that point in time to continue with medical management  -He is maintained on Flomax 0.4 mg and finasteride 5 mg  -Patient states that he still has bothersome postvoid dribbling.  -Discussed bladder irritants  -I reviewed manual maneuvers that patient can utilize to prevent/reduce this symptom.  I also offered pelvic floor physical therapy.  Patient wanted to take medicinal approach so as he is not having any increase in dizziness/lightheadedness we discussed plan to increase Flomax to 2 capsules a day to see if there is any improvement. He is aware that he has to call the office if he experiences any dizziness or lightheadedness He will follow-up in 3 months for symptom reassessment          Chief Complaint:   Charlie is a 83 y.o. male presenting to the office for a follow up visit regarding BPH with urinary tract symptoms        Subjective    Patient is a 83-year-old male with a remote history of TURP who presents to the office today for follow-up.  He was last seen in the office in August 2022 for a cystoscopy by Dr. Ramirez.  Patient has been on Flomax 0.4 mg and finasteride 5 mg long-term for symptoms.  At that point in time patient was having bothersome postvoid dribbling as well as increased urinary frequency.  Patient does have a history of diabetes and utilizes a diuretic 2 times a week.  Cystoscopy did not reveal any concerning findings and patient was recommended to continue with medical regimen..  He presents for follow-up today.  He is still bothered by his postvoid dribbling predominantly.  He states that he wants to avoid overt urinary incontinence..  He denies any dizziness or lightheadedness with the use of  Flomax        ROS:   Review of Systems   Constitutional: Negative.  Negative for chills, fatigue and fever.   HENT: Negative.     Respiratory:  Negative for shortness of breath.    Cardiovascular:  Negative for chest pain.   Gastrointestinal: Negative.  Negative for abdominal pain.   Endocrine: Negative.    Musculoskeletal: Negative.    Skin: Negative.    Neurological: Negative.  Negative for dizziness and light-headedness.   Hematological: Negative.    Psychiatric/Behavioral: Negative.           Past Medical History  Past Medical History:   Diagnosis Date    Asymptomatic microscopic hematuria     BPH with obstruction/lower urinary tract symptoms     BPH without obstruction/lower urinary tract symptoms     Cardiac disease     Elevated PSA     Family history of malignant neoplasm of prostate     Feeling of incomplete bladder emptying     GERD (gastroesophageal reflux disease)     Hyperlipidemia     Kidney stone     Nocturia     Other specified disorders of kidney and ureter     Personal history of urinary calculi        Past Surgical History  Past Surgical History:   Procedure Laterality Date    BACK SURGERY  12/03/2019    CYSTOSCOPY  2016    PROSTATE BIOPSY  2016    TOTAL HIP ARTHROPLASTY Left 02/2021    TRANSURETHRAL RESECTION OF PROSTATE  1998       Past Family History  Family History   Problem Relation Age of Onset    Other Mother         sepsis    Prostate cancer Father        Past Social history  Social History     Socioeconomic History    Marital status: /Civil Union     Spouse name: Not on file    Number of children: Not on file    Years of education: Not on file    Highest education level: Not on file   Occupational History    Occupation: farmer   Tobacco Use    Smoking status: Never    Smokeless tobacco: Never   Vaping Use    Vaping status: Never Used   Substance and Sexual Activity    Alcohol use: Never    Drug use: Never    Sexual activity: Not on file   Other Topics Concern    Not on file   Social  History Narrative    Not on file     Social Determinants of Health     Financial Resource Strain: Not on file   Food Insecurity: No Food Insecurity (3/4/2022)    Hunger Vital Sign     Worried About Running Out of Food in the Last Year: Never true     Ran Out of Food in the Last Year: Never true   Transportation Needs: No Transportation Needs (3/4/2022)    PRAPARE - Transportation     Lack of Transportation (Medical): No     Lack of Transportation (Non-Medical): No   Physical Activity: Not on file   Stress: Not on file   Social Connections: Not on file   Intimate Partner Violence: Not on file   Housing Stability: Low Risk  (3/4/2022)    Housing Stability Vital Sign     Unable to Pay for Housing in the Last Year: No     Number of Places Lived in the Last Year: 1     Unstable Housing in the Last Year: No       Current Medications  Current Outpatient Medications   Medication Sig Dispense Refill    atorvastatin (LIPITOR) 40 mg tablet Take 40 mg by mouth daily.      Cholecalciferol 5000 units capsule 1 tab daily      finasteride (PROSCAR) 5 mg tablet Take 1 tablet (5 mg total) by mouth daily 30 tablet 0    furosemide (LASIX) 20 mg tablet Take 1 tablet (20 mg total) by mouth daily as needed (Shortness of breath, leg swelling, weight gain  3 lbs in a day or 5 lbs in a week) Take 2 times a week . On Tuesday and Friday  0    iron polysaccharides (FERREX) 150 mg capsule Take 150 mg by mouth 2 (two) times a day      metFORMIN (GLUCOPHAGE-XR) 500 mg 24 hr tablet Take 500 mg by mouth daily with breakfast      metoprolol tartrate (LOPRESSOR) 25 mg tablet Take 0.5 tablets (12.5 mg total) by mouth every 12 (twelve) hours 45 tablet 1    tamsulosin (FLOMAX) 0.4 mg Take 1 capsule (0.4 mg total) by mouth daily at bedtime 90 capsule 3    warfarin (COUMADIN) 5 mg tablet Take 5 mg by mouth daily.      allopurinol (ZYLOPRIM) 300 mg tablet Take 300 mg by mouth daily      prochlorperazine (COMPAZINE) 10 mg tablet Take 10 mg by mouth every 6  "(six) hours as needed (Patient not taking: Reported on 1/11/2024)      Venetoclax 100 MG TABS Take 400 mg by mouth daily       No current facility-administered medications for this visit.       Allergies  No Known Allergies    OBJECTIVE    Vitals   Vitals:    01/11/24 1111   BP: 118/78   BP Location: Right arm   Patient Position: Sitting   Cuff Size: Adult   Pulse: 73   SpO2: 98%   Weight: 77 kg (169 lb 12.8 oz)   Height: 5' 8\" (1.727 m)       Physical Exam  Constitutional:       General: He is not in acute distress.     Appearance: Normal appearance. He is normal weight. He is not ill-appearing or toxic-appearing.   HENT:      Head: Normocephalic and atraumatic.   Eyes:      Conjunctiva/sclera: Conjunctivae normal.   Cardiovascular:      Rate and Rhythm: Normal rate.   Pulmonary:      Effort: Pulmonary effort is normal. No respiratory distress.   Skin:     General: Skin is warm and dry.   Neurological:      General: No focal deficit present.      Mental Status: He is alert and oriented to person, place, and time.      Cranial Nerves: No cranial nerve deficit.   Psychiatric:         Mood and Affect: Mood normal.         Behavior: Behavior normal.         Thought Content: Thought content normal.          Labs:     Lab Results   Component Value Date    PSA 1.2 07/20/2020    PSA 0.9 12/20/2019    PSA 2.3 07/19/2018     Lab Results   Component Value Date    CREATININE 0.96 03/05/2022      Lab Results   Component Value Date    HGBA1C 6.1 (H) 08/21/2023     Lab Results   Component Value Date    CALCIUM 8.1 (L) 03/05/2022    K 4.5 03/05/2022    CO2 24 03/05/2022     (H) 03/05/2022    BUN 27 (H) 03/05/2022    CREATININE 0.96 03/05/2022       I have personally reviewed all pertinent lab results and reviewed with patient    Imaging       Jorje Sparks PA-C  Date: 1/11/2024 Time: 11:15 AM  [facility]    This note was written using fluency dictation software. Please excuse any resulting minor " grammatical errors.

## 2024-04-12 ENCOUNTER — OFFICE VISIT (OUTPATIENT)
Dept: UROLOGY | Facility: CLINIC | Age: 84
End: 2024-04-12
Payer: MEDICARE

## 2024-04-12 VITALS
HEART RATE: 80 BPM | HEIGHT: 68 IN | BODY MASS INDEX: 25.61 KG/M2 | SYSTOLIC BLOOD PRESSURE: 140 MMHG | WEIGHT: 169 LBS | OXYGEN SATURATION: 99 % | DIASTOLIC BLOOD PRESSURE: 70 MMHG

## 2024-04-12 DIAGNOSIS — N39.43 DRIBBLING FOLLOWING URINATION: Primary | ICD-10-CM

## 2024-04-12 PROCEDURE — 99214 OFFICE O/P EST MOD 30 MIN: CPT | Performed by: UROLOGY

## 2024-04-12 RX ORDER — TROSPIUM CHLORIDE 20 MG/1
20 TABLET, FILM COATED ORAL DAILY
Qty: 30 TABLET | Refills: 2 | Status: SHIPPED | OUTPATIENT
Start: 2024-04-12 | End: 2024-07-11

## 2024-04-12 NOTE — PROGRESS NOTES
Charlie Morales is a(n) 83 y.o. male. , :  1940    Subjective   Chief Complaint:   Chief Complaint   Patient presents with    Follow-up     Diagnoses: The encounter diagnosis was Dribbling following urination.    Assessment/Plan  83 M leaks before and after voiding.  Willing to try OAB meds since the frequency and leakage. Nocturia and frequency despite low fluid intake daytime. Hard of hearing.  Thinks he needs urine test to find prostate cancer.  Explained not the case.  Flomax didn't help the dribbling or frequency.    Plan: Trial of trospium 20 daily. Follow up in 4-6 weeks.    Patient Instructions   Really think we can try to calm the bladder with one pill.     History  BPH with obstruction.  Cystoscopy 2022 Ashley minimal regrowth with poor coaptation of external sphincter.  Advised on Kegel exercises.  On Flomax and finasteride.  Postvoid dribbling and urinary frequency.  Offered pelvic floor therapy 2024.  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      Prior Visits  2024 Jorje  Patient is a 83-year-old male with a remote history of TURP who presents to the office today for follow-up.  He was last seen in the office in 2022 for a cystoscopy by Dr. Ramirez.  Patient has been on Flomax 0.4 mg and finasteride 5 mg long-term for symptoms.  At that point in time patient was having bothersome postvoid dribbling as well as increased urinary frequency.  Patient does have a history of diabetes and utilizes a diuretic 2 times a week.  Cystoscopy did not reveal any concerning findings and patient was recommended to continue with medical regimen..  He presents for follow-up today.  He is still bothered by his postvoid dribbling predominantly.  He states that he wants to avoid overt urinary incontinence..  He denies any dizziness or lightheadedness with the use of Flomax.    2024 OV  "Sheri  83 M leaks before and after voiding.  Willing to try OAB meds since the frequency and leakage. Nocturia and frequency despite low fluid intake daytime. Hard of hearing.  Thinks he needs urine test to find prostate cancer.  Explained     Plan: Trial of trospium 20 daily. Follow up in 4-6 weeks.    Lab Results   Component Value Date    PSA 1.2 07/20/2020    PSA 0.9 12/20/2019    PSA 2.3 07/19/2018     No results found for: \"TESTOSTERONE\"  No components found for: \"CR\"    No Known Allergies    Review of Systems    Past Surgical History:   Procedure Laterality Date    BACK SURGERY  12/03/2019    CYSTOSCOPY  2016    PROSTATE BIOPSY  2016    TOTAL HIP ARTHROPLASTY Left 02/2021    TRANSURETHRAL RESECTION OF PROSTATE  1998       Family History   Problem Relation Age of Onset    Other Mother         sepsis    Prostate cancer Father        Social History     Socioeconomic History    Marital status: /Civil Union     Spouse name: Not on file    Number of children: Not on file    Years of education: Not on file    Highest education level: Not on file   Occupational History    Occupation: farmer   Tobacco Use    Smoking status: Never    Smokeless tobacco: Never   Vaping Use    Vaping status: Never Used   Substance and Sexual Activity    Alcohol use: Never    Drug use: Never    Sexual activity: Not on file   Other Topics Concern    Not on file   Social History Narrative    Not on file     Social Determinants of Health     Financial Resource Strain: Not on file   Food Insecurity: No Food Insecurity (3/4/2022)    Hunger Vital Sign     Worried About Running Out of Food in the Last Year: Never true     Ran Out of Food in the Last Year: Never true   Transportation Needs: No Transportation Needs (3/4/2022)    PRAPARE - Transportation     Lack of Transportation (Medical): No     Lack of Transportation (Non-Medical): No   Physical Activity: Not on file   Stress: Not on file   Social Connections: Not on file   Intimate " "Partner Violence: Not on file   Housing Stability: Low Risk  (3/4/2022)    Housing Stability Vital Sign     Unable to Pay for Housing in the Last Year: No     Number of Places Lived in the Last Year: 1     Unstable Housing in the Last Year: No         Current Outpatient Medications:     atorvastatin (LIPITOR) 40 mg tablet, Take 40 mg by mouth daily., Disp: , Rfl:     Cholecalciferol 5000 units capsule, 1 tab daily, Disp: , Rfl:     finasteride (PROSCAR) 5 mg tablet, Take 1 tablet (5 mg total) by mouth daily, Disp: 90 tablet, Rfl: 3    furosemide (LASIX) 20 mg tablet, Take 1 tablet (20 mg total) by mouth daily as needed (Shortness of breath, leg swelling, weight gain  3 lbs in a day or 5 lbs in a week) Take 2 times a week . On Tuesday and Friday, Disp: , Rfl: 0    iron polysaccharides (FERREX) 150 mg capsule, Take 150 mg by mouth 2 (two) times a day, Disp: , Rfl:     metFORMIN (GLUCOPHAGE-XR) 500 mg 24 hr tablet, Take 500 mg by mouth daily with breakfast, Disp: , Rfl:     metoprolol tartrate (LOPRESSOR) 25 mg tablet, Take 0.5 tablets (12.5 mg total) by mouth every 12 (twelve) hours, Disp: 45 tablet, Rfl: 1    tamsulosin (FLOMAX) 0.4 mg, Take 2 capsules (0.8 mg total) by mouth daily at bedtime, Disp: 90 capsule, Rfl: 3    trospium chloride (SANCTURA) 20 mg tablet, Take 1 tablet (20 mg total) by mouth daily, Disp: 30 tablet, Rfl: 2    warfarin (COUMADIN) 5 mg tablet, Take 5 mg by mouth daily., Disp: , Rfl:     allopurinol (ZYLOPRIM) 300 mg tablet, Take 300 mg by mouth daily, Disp: , Rfl:     prochlorperazine (COMPAZINE) 10 mg tablet, Take 10 mg by mouth every 6 (six) hours as needed (Patient not taking: Reported on 1/11/2024), Disp: , Rfl:     Venetoclax 100 MG TABS, Take 400 mg by mouth daily, Disp: , Rfl:     Objective     /70 (BP Location: Left arm, Patient Position: Sitting, Cuff Size: Standard)   Pulse 80   Ht 5' 8\" (1.727 m)   Wt 76.7 kg (169 lb)   SpO2 99%   BMI 25.70 kg/m²     Physical " Exam      [facility]

## 2024-07-16 ENCOUNTER — OFFICE VISIT (OUTPATIENT)
Dept: UROLOGY | Facility: CLINIC | Age: 84
End: 2024-07-16
Payer: MEDICARE

## 2024-07-16 VITALS
BODY MASS INDEX: 25.16 KG/M2 | SYSTOLIC BLOOD PRESSURE: 120 MMHG | HEIGHT: 68 IN | HEART RATE: 80 BPM | WEIGHT: 166 LBS | OXYGEN SATURATION: 98 % | DIASTOLIC BLOOD PRESSURE: 80 MMHG

## 2024-07-16 DIAGNOSIS — N39.43 DRIBBLING FOLLOWING URINATION: ICD-10-CM

## 2024-07-16 DIAGNOSIS — N40.1 BENIGN LOCALIZED HYPERPLASIA OF PROSTATE WITH URINARY OBSTRUCTION AND LOWER URINARY TRACT SYMPTOMS: ICD-10-CM

## 2024-07-16 DIAGNOSIS — N13.9 BENIGN LOCALIZED HYPERPLASIA OF PROSTATE WITH URINARY OBSTRUCTION AND LOWER URINARY TRACT SYMPTOMS: ICD-10-CM

## 2024-07-16 PROCEDURE — 99213 OFFICE O/P EST LOW 20 MIN: CPT | Performed by: UROLOGY

## 2024-07-16 RX ORDER — TROSPIUM CHLORIDE 20 MG/1
20 TABLET, FILM COATED ORAL DAILY
Qty: 90 TABLET | Refills: 3 | Status: SHIPPED | OUTPATIENT
Start: 2024-07-16 | End: 2025-07-11

## 2024-07-16 RX ORDER — FINASTERIDE 5 MG/1
5 TABLET, FILM COATED ORAL DAILY
Qty: 90 TABLET | Refills: 3 | Status: SHIPPED | OUTPATIENT
Start: 2024-07-16 | End: 2025-07-11

## 2024-07-16 RX ORDER — TAMSULOSIN HYDROCHLORIDE 0.4 MG/1
0.8 CAPSULE ORAL
Qty: 90 CAPSULE | Refills: 3 | Status: SHIPPED | OUTPATIENT
Start: 2024-07-16 | End: 2025-07-11

## 2024-07-16 NOTE — PROGRESS NOTES
Charlie Morales is a(n) 84 y.o. male. , :  1940    Subjective     Assessment:  Diagnoses of 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 and lower urinary tract symptoms and Dribbling following urination were pertinent to this visit.  84 M doing well on trospium. No side effects. Wants to refill all meds. Plan: Return in 6 months.     History  BPH with obstruction.  Cystoscopy 2022 Ashley minimal regrowth with poor coaptation of external sphincter.  Advised on Kegel exercises.  On Flomax and finasteride.  Postvoid dribbling and urinary frequency.  Offered pelvic floor therapy 2024.  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     Prior Visits  2024 Jorje  Patient is a 83-year-old male with a remote history of TURP who presents to the office today for follow-up.  He was last seen in the office in 2022 for a cystoscopy by Dr. Ramirez.  Patient has been on Flomax 0.4 mg and finasteride 5 mg long-term for symptoms.  At that point in time patient was having bothersome postvoid dribbling as well as increased urinary frequency.  Patient does have a history of diabetes and utilizes a diuretic 2 times a week.  Cystoscopy did not reveal any concerning findings and patient was recommended to continue with medical regimen. He presents for follow-up today.  He is still bothered by his postvoid dribbling predominantly.  He states that he wants to avoid overt urinary incontinence..  He denies any dizziness or lightheadedness with the use of Flomax.     2024 EDITH Wade  83 M leaks before and after voiding.  Willing to try OAB meds since the frequency and leakage. Nocturia and frequency despite low fluid intake daytime. Hard of hearing.  Thinks he needs urine test to find prostate cancer.  Explained (not really helpful at his age).     Plan: Trial of trospium 20 daily. Follow up in  "4-6 weeks.    7/16/2024 OV Sheri  84 M doing well on trospium. No side effects. Wants to refill all meds. Plan: Return in 6 months.    Review of Systems    Lab Results   Component Value Date    PSA 1.2 07/20/2020    PSA 0.9 12/20/2019    PSA 2.3 07/19/2018     No results found for: \"TESTOSTERONE\"  No components found for: \"CR\"  No results found for: \"HBA1C\"    Objective     /80 (BP Location: Left arm, Patient Position: Sitting, Cuff Size: Standard)   Pulse 80   Ht 5' 8\" (1.727 m)   Wt 75.3 kg (166 lb)   SpO2 98%   BMI 25.24 kg/m²     Physical Exam      [facility]  "

## 2024-07-22 ENCOUNTER — TELEPHONE (OUTPATIENT)
Dept: UROLOGY | Facility: AMBULATORY SURGERY CENTER | Age: 84
End: 2024-07-22

## 2024-07-22 NOTE — TELEPHONE ENCOUNTER
[facility] - [place], [place] - [address]      Request refill for: tamsulosin (FLOMAX) 0.4 mg

## 2024-12-13 ENCOUNTER — APPOINTMENT (OUTPATIENT)
Dept: RADIOLOGY | Facility: CLINIC | Age: 84
End: 2024-12-13
Payer: MEDICARE

## 2024-12-13 VITALS
WEIGHT: 166 LBS | BODY MASS INDEX: 25.16 KG/M2 | HEART RATE: 62 BPM | SYSTOLIC BLOOD PRESSURE: 133 MMHG | DIASTOLIC BLOOD PRESSURE: 80 MMHG | OXYGEN SATURATION: 100 % | HEIGHT: 68 IN

## 2024-12-13 DIAGNOSIS — M17.11 PRIMARY OSTEOARTHRITIS OF RIGHT KNEE: ICD-10-CM

## 2024-12-13 DIAGNOSIS — Z01.89 ENCOUNTER FOR LOWER EXTREMITY COMPARISON IMAGING STUDY: ICD-10-CM

## 2024-12-13 DIAGNOSIS — M25.561 RIGHT KNEE PAIN, UNSPECIFIED CHRONICITY: ICD-10-CM

## 2024-12-13 DIAGNOSIS — M25.561 CHRONIC PAIN OF RIGHT KNEE: Primary | ICD-10-CM

## 2024-12-13 DIAGNOSIS — G89.29 CHRONIC PAIN OF RIGHT KNEE: Primary | ICD-10-CM

## 2024-12-13 PROCEDURE — 73562 X-RAY EXAM OF KNEE 3: CPT

## 2024-12-13 PROCEDURE — 20610 DRAIN/INJ JOINT/BURSA W/O US: CPT | Performed by: ORTHOPAEDIC SURGERY

## 2024-12-13 PROCEDURE — 99213 OFFICE O/P EST LOW 20 MIN: CPT | Performed by: ORTHOPAEDIC SURGERY

## 2024-12-13 PROCEDURE — 73564 X-RAY EXAM KNEE 4 OR MORE: CPT

## 2024-12-13 RX ADMIN — TRIAMCINOLONE ACETONIDE 40 MG: 40 INJECTION, SUSPENSION INTRA-ARTICULAR; INTRAMUSCULAR at 13:45

## 2024-12-13 RX ADMIN — BUPIVACAINE HYDROCHLORIDE 4 ML: 2.5 INJECTION, SOLUTION INFILTRATION; PERINEURAL at 13:45

## 2024-12-13 NOTE — PROGRESS NOTES
Ortho Sports Medicine New Patient Visit     Assesment:   84 y.o. male with right knee osteoarthritis    Plan:    We had a long discussion regarding his right knee degenerative joint disease including options for treatment.  I recommended starting with conservative management options.  I provided a referral to physical therapy as I do believe this will help with strength and mobility, and may improved symptoms significantly.   We discussed options for injections including corticosteroids, viscosupplementation, and biologics including risks and benefits of each. Risks and benefits of corticosteroid injections discussed, patient consented to and underwent the below procedure. Procedure tolerated well, post injection protocol advised. He understands this can be repeated no sooner then 3 months. Patient is diabetic discussed CSI can impact his sugars, he will need to monitor more regularly for the next 24-48 hours.     Large joint arthrocentesis: R knee  [place] Protocol:  procedure performed by consultantConsent: Verbal consent obtained.  Risks and benefits: risks, benefits and alternatives were discussed  Consent given by: patient  Timeout called at: 12/13/2024 1:56 PM.  Patient understanding: patient states understanding of the procedure being performed  Patient identity confirmed: verbally with patient  Supporting Documentation  Indications: pain and diagnostic evaluation   Procedure Details  Location: knee - R knee  Preparation: Patient was prepped and draped in the usual sterile fashion  Needle size: 22 G  Ultrasound guidance: no  Approach: anterolateral  Medications administered: 40 mg triamcinolone acetonide 40 mg/mL; 4 mL bupivacaine 0.25 %    Patient tolerance: patient tolerated the procedure well with no immediate complications  Dressing:  Sterile dressing applied            Follow up:    No follow-ups on file.        Chief Complaint   Patient presents with    Right Knee - Pain       History of Present  Illness:    The patient is a 84 y.o. male presents for initial evaluation of right knee pain. History of ACL reconstruction 20 years ago. No acute injury or trauma. Right knee pain has been going on for a while but has become more severe recently. He has tried a compression sleeve and OTC analgesics as needed. Pain is aggravated with weight bearing and activity, alleviated with rest. Pain is daily. He has not tried PT or CSI. History of right hip total arthroplasty.      Knee Surgical History:  ACL reconstruction     Past Medical, Social and Family History:  Past Medical History:   Diagnosis Date    Asymptomatic microscopic hematuria     BPH with obstruction/lower urinary tract symptoms     BPH without obstruction/lower urinary tract symptoms     Cardiac disease     Elevated PSA     Family history of malignant neoplasm of prostate     Feeling of incomplete bladder emptying     GERD (gastroesophageal reflux disease)     Hyperlipidemia     Kidney stone     Nocturia     Other specified disorders of kidney and ureter     Personal history of urinary calculi      Past Surgical History:   Procedure Laterality Date    BACK SURGERY  12/03/2019    CYSTOSCOPY  2016    PROSTATE BIOPSY  2016    TOTAL HIP ARTHROPLASTY Left 02/2021    TRANSURETHRAL RESECTION OF PROSTATE  1998     No Known Allergies  Current Outpatient Medications on File Prior to Visit   Medication Sig Dispense Refill    atorvastatin (LIPITOR) 40 mg tablet Take 40 mg by mouth daily.      finasteride (PROSCAR) 5 mg tablet Take 1 tablet (5 mg total) by mouth daily 90 tablet 3    iron polysaccharides (FERREX) 150 mg capsule Take 150 mg by mouth 2 (two) times a day      metFORMIN (GLUCOPHAGE-XR) 500 mg 24 hr tablet Take 500 mg by mouth daily with breakfast      metoprolol tartrate (LOPRESSOR) 25 mg tablet Take 0.5 tablets (12.5 mg total) by mouth every 12 (twelve) hours 45 tablet 1    tamsulosin (FLOMAX) 0.4 mg Take 2 capsules (0.8 mg total) by mouth daily at bedtime  90 capsule 3    trospium chloride (SANCTURA) 20 mg tablet Take 1 tablet (20 mg total) by mouth daily 90 tablet 3    warfarin (COUMADIN) 5 mg tablet Take 5 mg by mouth daily.      allopurinol (ZYLOPRIM) 300 mg tablet Take 300 mg by mouth daily      Cholecalciferol 5000 units capsule 1 tab daily      furosemide (LASIX) 20 mg tablet Take 1 tablet (20 mg total) by mouth daily as needed (Shortness of breath, leg swelling, weight gain  3 lbs in a day or 5 lbs in a week) Take 2 times a week . On Tuesday and Friday (Patient not taking: Reported on 12/13/2024)  0    prochlorperazine (COMPAZINE) 10 mg tablet Take 10 mg by mouth every 6 (six) hours as needed (Patient not taking: Reported on 1/11/2024)      Venetoclax 100 MG TABS Take 400 mg by mouth daily       No current facility-administered medications on file prior to visit.     Social History     Socioeconomic History    Marital status: /Civil Union     Spouse name: Not on file    Number of children: Not on file    Years of education: Not on file    Highest education level: Not on file   Occupational History    Occupation: farmer   Tobacco Use    Smoking status: Never    Smokeless tobacco: Never   Vaping Use    Vaping status: Never Used   Substance and Sexual Activity    Alcohol use: Never    Drug use: Never    Sexual activity: Not on file   Other Topics Concern    Not on file   Social History Narrative    Not on file     Social Drivers of Health     Financial Resource Strain: Not on file   Food Insecurity: No Food Insecurity (3/4/2022)    Hunger Vital Sign     Worried About Running Out of Food in the Last Year: Never true     Ran Out of Food in the Last Year: Never true   Transportation Needs: No Transportation Needs (3/4/2022)    PRAPARE - Transportation     Lack of Transportation (Medical): No     Lack of Transportation (Non-Medical): No   Physical Activity: Not on file   Stress: Not on file   Social Connections: Not on file   Intimate Partner Violence: Not on  "file   Housing Stability: Low Risk  (3/4/2022)    Housing Stability Vital Sign     Unable to Pay for Housing in the Last Year: No     Number of Places Lived in the Last Year: 1     Unstable Housing in the Last Year: No         I have reviewed the past medical, surgical, social and family history, medications and allergies as documented in the EMR.    Review of systems: ROS is negative other than that noted in the HPI.  Constitutional: Negative for fatigue and fever.   HENT: Negative for sore throat.    Respiratory: Negative for shortness of breath.    Cardiovascular: Negative for chest pain.   Gastrointestinal: Negative for abdominal pain.   Endocrine: Negative for cold intolerance and heat intolerance.   Genitourinary: Negative for flank pain.   Musculoskeletal: Negative for back pain.   Skin: Negative for rash.   Allergic/Immunologic: Negative for immunocompromised state.   Neurological: Negative for dizziness.   Psychiatric/Behavioral: Negative for agitation.      Physical Exam:    Blood pressure 133/80, pulse 62, height 5' 8\" (1.727 m), weight 75.3 kg (166 lb), SpO2 100%.    General/Constitutional: NAD, well developed, well nourished  HENT: Normocephalic, atraumatic  CV: Intact distal pulses, regular rate  Resp: No respiratory distress or labored breathing  Abdomen: soft, nondistended   Lymphatic: No lymphadenopathy palpated  Neuro: Alert and Oriented x 3, no focal deficits  Psych: Normal mood, normal affect  Skin: Warm, dry, no rashes, no erythema      Knee Exam:   No significant skin lesions or deformity  Varus deformity bilaterally  TTP medial and lateral joint line  Range of motion from 0 to 120  Knee is stable to varus stress, valgus stress, Lachman, and posterior drawer.    Neurovascularly intact distally    Knee Imaging    X-rays of the right knee reviewed and interpreted today. These show tricompartmental degenerative changes with joint space loss, subchondral sclerosis, and osteophytosis      Scribe " Attestation      I,:  Judy Amezcua am acting as a scribe while in the presence of the attending physician.:       I,:  Odilon Garnica MD personally performed the services described in this documentation    as scribed in my presence.:

## 2024-12-16 RX ORDER — TRIAMCINOLONE ACETONIDE 40 MG/ML
40 INJECTION, SUSPENSION INTRA-ARTICULAR; INTRAMUSCULAR
Status: COMPLETED | OUTPATIENT
Start: 2024-12-13 | End: 2024-12-13

## 2024-12-16 RX ORDER — BUPIVACAINE HYDROCHLORIDE 2.5 MG/ML
4 INJECTION, SOLUTION INFILTRATION; PERINEURAL
Status: COMPLETED | OUTPATIENT
Start: 2024-12-13 | End: 2024-12-13

## 2025-01-17 ENCOUNTER — TELEPHONE (OUTPATIENT)
Dept: UROLOGY | Facility: CLINIC | Age: 85
End: 2025-01-17

## 2025-01-17 NOTE — TELEPHONE ENCOUNTER
Patient was called and a message left to call back to reschedule appointment that was scheduled for this morning 1/17.

## 2025-01-28 ENCOUNTER — OFFICE VISIT (OUTPATIENT)
Dept: UROLOGY | Facility: CLINIC | Age: 85
End: 2025-01-28
Payer: MEDICARE

## 2025-01-28 VITALS
OXYGEN SATURATION: 98 % | WEIGHT: 167 LBS | DIASTOLIC BLOOD PRESSURE: 80 MMHG | HEART RATE: 81 BPM | HEIGHT: 68 IN | BODY MASS INDEX: 25.31 KG/M2 | SYSTOLIC BLOOD PRESSURE: 128 MMHG

## 2025-01-28 DIAGNOSIS — N13.9 BENIGN LOCALIZED HYPERPLASIA OF PROSTATE WITH URINARY OBSTRUCTION AND LOWER URINARY TRACT SYMPTOMS: Primary | ICD-10-CM

## 2025-01-28 DIAGNOSIS — N32.81 OAB (OVERACTIVE BLADDER): ICD-10-CM

## 2025-01-28 DIAGNOSIS — Z87.442 HISTORY OF RENAL STONE: ICD-10-CM

## 2025-01-28 DIAGNOSIS — N40.1 BENIGN LOCALIZED HYPERPLASIA OF PROSTATE WITH URINARY OBSTRUCTION AND LOWER URINARY TRACT SYMPTOMS: Primary | ICD-10-CM

## 2025-01-28 DIAGNOSIS — R35.1 NOCTURIA: ICD-10-CM

## 2025-01-28 LAB — POST-VOID RESIDUAL VOLUME, ML POC: 12 ML

## 2025-01-28 PROCEDURE — 99213 OFFICE O/P EST LOW 20 MIN: CPT | Performed by: PHYSICIAN ASSISTANT

## 2025-01-28 PROCEDURE — 51798 US URINE CAPACITY MEASURE: CPT | Performed by: PHYSICIAN ASSISTANT

## 2025-01-28 NOTE — PROGRESS NOTES
Charlie Morales is a(n) 84 y.o. male. , :  1940    Subjective     Assessment:  The primary encounter diagnosis was 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 and lower urinary tract symptoms. Diagnoses of History of renal stone, Nocturia, and OAB (overactive bladder) were also pertinent to this visit.  BPH  BPH with history of prior TURP  and subsequent finasteride and tamsulosin 0.8mg HS   Cystoscopy 2022 indicated open anterior channel with minimal regrowth.    Poor coaptation of external sphincter noted at time of cystoscopy  Wants to continue with current regiment     Renal stone  Renal stone history with prior ESWL  No stones noted on CT urogram 2019    OAB  OAB on trospium 20 mg daily helped with nocturia     Plan:  F/u 6 months CHALINO and check on LUTs   OK to continue with trospium 20mg daily to help with frequency/urgency  OK to continue with the finasteride 5mg and tamsulosin 0.8mg HS if helping with his voiding.   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    Past  History  BPH with obstruction.  Cystoscopy 2022 Ashley minimal regrowth with poor coaptation of external sphincter.  Advised on Kegel exercises.  On Flomax and finasteride.  Postvoid dribbling and urinary frequency.  Offered pelvic floor therapy 2024.  Flomax increased instead to 2 daily 2024  Renal calculus  OAB and nocturia helped with Trospium 20mg daily   Erectile dysfunction      Past  surgical history   TURP   ESWL  21 cystoscopy Dr. Ramirez -evidence of prior TURP.  Open anterior channel minimal regrowth.  Poor coaptation of the external sphincter    Prior Visits  2024 Jorje  Patient is a 83-year-old male with a remote  history of TURP who presents to the office today for follow-up.  He was last seen in the office in August 2022 for a cystoscopy by Dr. Ramirez.  Patient has been on Flomax 0.4 mg and finasteride 5 mg long-term for symptoms.  At that point in time patient was having bothersome postvoid dribbling as well as increased urinary frequency.  Patient does have a history of diabetes and utilizes a diuretic 2 times a week.  Cystoscopy did not reveal any concerning findings and patient was recommended to continue with medical regimen. He presents for follow-up today.  He is still bothered by his postvoid dribbling predominantly.  He states that he wants to avoid overt urinary incontinence..  He denies any dizziness or lightheadedness with the use of Flomax.     4/12/2024 OV Sheri  83 M leaks before and after voiding.  Willing to try OAB meds since the frequency and leakage. Nocturia and frequency despite low fluid intake daytime. Hard of hearing.  Thinks he needs urine test to find prostate cancer.  Explained (not really helpful at his age).     Plan: Trial of trospium 20 daily. Follow up in 4-6 weeks.     7/16/2024 OV Sheri  84 M doing well on trospium. No side effects. Wants to refill all meds. Plan: Return in 6 months.    Assessment:  Diagnoses of 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 and lower urinary tract symptoms and Dribbling following urination were pertinent to this visit.  84 M doing well on trospium. No side effects. Wants to refill all meds. Plan: Return in 6 months.    1/28/2025 OV Car Bean  84-year-old gentleman with history of stones, BPH treated with TURP 1998 was subsequently finasteride and tamsulosin.  More recent cystoscopy 08/17/2022 continue to indicate a TUR defect and no significant obstruction.  Patient currently is on trospium for his overactive bladder presents for follow-up.  Notes while on the finasteride 5mg, tamsulosin 0.4mg, and vesicare 10mg  is  "voiding less at night.  Denies any dysuria, hematuria, flank pain or passed stones,     Review of Systems    Lab Results   Component Value Date    PSA 1.2 07/20/2020    PSA 0.9 12/20/2019    PSA 2.3 07/19/2018     No results found for: \"TESTOSTERONE\"  No components found for: \"CR\"  No results found for: \"HBA1C\"    Objective     /80 (BP Location: Left arm, Patient Position: Sitting, Cuff Size: Adult)   Pulse 81   Ht 5' 8\" (1.727 m)   Wt 75.8 kg (167 lb)   SpO2 98%   BMI 25.39 kg/m²     Physical Exam  Cardiovascular:      Rate and Rhythm: Normal rate.   Pulmonary:      Effort: Pulmonary effort is normal.   Skin:     General: Skin is warm.   Neurological:      Mental Status: He is alert.   Psychiatric:         Mood and Affect: Mood normal.           Car Bean [facility]  "

## 2025-01-30 DIAGNOSIS — N13.9 BENIGN LOCALIZED HYPERPLASIA OF PROSTATE WITH URINARY OBSTRUCTION AND LOWER URINARY TRACT SYMPTOMS: ICD-10-CM

## 2025-01-30 DIAGNOSIS — N40.1 BENIGN LOCALIZED HYPERPLASIA OF PROSTATE WITH URINARY OBSTRUCTION AND LOWER URINARY TRACT SYMPTOMS: ICD-10-CM

## 2025-01-31 RX ORDER — TAMSULOSIN HYDROCHLORIDE 0.4 MG/1
CAPSULE ORAL
Qty: 90 CAPSULE | Refills: 1 | Status: SHIPPED | OUTPATIENT
Start: 2025-01-31

## 2025-04-29 DIAGNOSIS — N40.1 BENIGN LOCALIZED HYPERPLASIA OF PROSTATE WITH URINARY OBSTRUCTION AND LOWER URINARY TRACT SYMPTOMS: ICD-10-CM

## 2025-04-29 DIAGNOSIS — N13.9 BENIGN LOCALIZED HYPERPLASIA OF PROSTATE WITH URINARY OBSTRUCTION AND LOWER URINARY TRACT SYMPTOMS: ICD-10-CM

## 2025-04-29 RX ORDER — TAMSULOSIN HYDROCHLORIDE 0.4 MG/1
CAPSULE ORAL
Qty: 90 CAPSULE | Refills: 1 | Status: SHIPPED | OUTPATIENT
Start: 2025-04-29

## 2025-07-16 DIAGNOSIS — N13.9 BENIGN LOCALIZED HYPERPLASIA OF PROSTATE WITH URINARY OBSTRUCTION AND LOWER URINARY TRACT SYMPTOMS: ICD-10-CM

## 2025-07-16 DIAGNOSIS — N40.1 BENIGN LOCALIZED HYPERPLASIA OF PROSTATE WITH URINARY OBSTRUCTION AND LOWER URINARY TRACT SYMPTOMS: ICD-10-CM

## 2025-07-16 NOTE — TELEPHONE ENCOUNTER
Reason for call:   [x] Refill   [] Prior Auth  [] Other:     Office:   [] PCP/Provider -   [x] Specialty/Provider -     Medication: finasteride (PROSCAR) 5 mg tablet     Dose/Frequency: Take 1 tablet (5 mg total) by mouth daily     Quantity: 90    Pharmacy: [place], [place]    Local Pharmacy   Does the patient have enough for 3 days?   [x] Yes   [] No - Send as HP to POD

## 2025-07-17 RX ORDER — FINASTERIDE 5 MG/1
5 TABLET, FILM COATED ORAL DAILY
Qty: 90 TABLET | Refills: 1 | Status: SHIPPED | OUTPATIENT
Start: 2025-07-17 | End: 2026-01-13

## 2025-07-21 DIAGNOSIS — N39.43 DRIBBLING FOLLOWING URINATION: ICD-10-CM

## 2025-07-22 RX ORDER — TROSPIUM CHLORIDE 20 MG/1
20 TABLET, FILM COATED ORAL DAILY
Qty: 90 TABLET | Refills: 1 | Status: SHIPPED | OUTPATIENT
Start: 2025-07-22

## 2025-07-25 NOTE — TELEPHONE ENCOUNTER
Patient called requesting refill for Trospium 20mg . Patient made aware medication was refilled on 07/22/25 for 90 with 1 refills to [facility]  pharmacy. Patient instructed to contact the pharmacy and speak with someone directly to obtain refills of medication. Patient advised to call back for refill if their pharmacy is unable to assist them. Patient verbalized understanding.

## 2025-07-30 DIAGNOSIS — N40.1 BENIGN LOCALIZED HYPERPLASIA OF PROSTATE WITH URINARY OBSTRUCTION AND LOWER URINARY TRACT SYMPTOMS: ICD-10-CM

## 2025-07-30 DIAGNOSIS — N13.9 BENIGN LOCALIZED HYPERPLASIA OF PROSTATE WITH URINARY OBSTRUCTION AND LOWER URINARY TRACT SYMPTOMS: ICD-10-CM

## 2025-07-31 RX ORDER — TAMSULOSIN HYDROCHLORIDE 0.4 MG/1
CAPSULE ORAL
Qty: 180 CAPSULE | Refills: 1 | Status: SHIPPED | OUTPATIENT
Start: 2025-07-31